# Patient Record
Sex: FEMALE | Race: WHITE | NOT HISPANIC OR LATINO | Employment: OTHER | URBAN - METROPOLITAN AREA
[De-identification: names, ages, dates, MRNs, and addresses within clinical notes are randomized per-mention and may not be internally consistent; named-entity substitution may affect disease eponyms.]

---

## 2017-01-16 ENCOUNTER — APPOINTMENT (OUTPATIENT)
Dept: CT IMAGING | Facility: HOSPITAL | Age: 67
End: 2017-01-16

## 2017-01-16 ENCOUNTER — HOSPITAL ENCOUNTER (EMERGENCY)
Facility: HOSPITAL | Age: 67
Discharge: HOME | End: 2017-01-16
Attending: EMERGENCY MEDICINE | Admitting: EMERGENCY MEDICINE

## 2017-01-16 VITALS
WEIGHT: 128 LBS | BODY MASS INDEX: 22.68 KG/M2 | HEART RATE: 71 BPM | TEMPERATURE: 99.5 F | RESPIRATION RATE: 16 BRPM | SYSTOLIC BLOOD PRESSURE: 127 MMHG | HEIGHT: 63 IN | OXYGEN SATURATION: 97 % | DIASTOLIC BLOOD PRESSURE: 79 MMHG

## 2017-01-16 DIAGNOSIS — J10.1 INFLUENZA A: Primary | ICD-10-CM

## 2017-01-16 DIAGNOSIS — R55 VASOVAGAL SYNCOPE: ICD-10-CM

## 2017-01-16 LAB
ALBUMIN SERPL-MCNC: 4.2 G/DL (ref 3.5–5.2)
ALBUMIN/GLOB SERPL: 1.4 G/DL
ALP SERPL-CCNC: 59 U/L (ref 39–117)
ALT SERPL W P-5'-P-CCNC: 22 U/L (ref 1–33)
ANION GAP SERPL CALCULATED.3IONS-SCNC: 16 MMOL/L
AST SERPL-CCNC: 25 U/L (ref 1–32)
BASOPHILS # BLD AUTO: 0.03 10*3/MM3 (ref 0–0.2)
BASOPHILS NFR BLD AUTO: 0.6 % (ref 0–1.5)
BILIRUB SERPL-MCNC: 0.3 MG/DL (ref 0.1–1.2)
BUN BLD-MCNC: 7 MG/DL (ref 8–23)
BUN/CREAT SERPL: 6.9 (ref 7–25)
CALCIUM SPEC-SCNC: 9.3 MG/DL (ref 8.6–10.5)
CHLORIDE SERPL-SCNC: 97 MMOL/L (ref 98–107)
CO2 SERPL-SCNC: 25 MMOL/L (ref 22–29)
CREAT BLD-MCNC: 1.02 MG/DL (ref 0.57–1)
DEPRECATED RDW RBC AUTO: 45.2 FL (ref 37–54)
EOSINOPHIL # BLD AUTO: 0.08 10*3/MM3 (ref 0–0.7)
EOSINOPHIL NFR BLD AUTO: 1.6 % (ref 0.3–6.2)
ERYTHROCYTE [DISTWIDTH] IN BLOOD BY AUTOMATED COUNT: 12.4 % (ref 11.7–13)
FLUAV AG NPH QL: POSITIVE
FLUBV AG NPH QL IA: NEGATIVE
GFR SERPL CREATININE-BSD FRML MDRD: 54 ML/MIN/1.73
GLOBULIN UR ELPH-MCNC: 2.9 GM/DL
GLUCOSE BLD-MCNC: 140 MG/DL (ref 65–99)
HCT VFR BLD AUTO: 40.6 % (ref 35.6–45.5)
HGB BLD-MCNC: 13.5 G/DL (ref 11.9–15.5)
IMM GRANULOCYTES # BLD: 0 10*3/MM3 (ref 0–0.03)
IMM GRANULOCYTES NFR BLD: 0 % (ref 0–0.5)
LYMPHOCYTES # BLD AUTO: 1.38 10*3/MM3 (ref 0.9–4.8)
LYMPHOCYTES NFR BLD AUTO: 27.3 % (ref 19.6–45.3)
MCH RBC QN AUTO: 32.7 PG (ref 26.9–32)
MCHC RBC AUTO-ENTMCNC: 33.3 G/DL (ref 32.4–36.3)
MCV RBC AUTO: 98.3 FL (ref 80.5–98.2)
MONOCYTES # BLD AUTO: 0.58 10*3/MM3 (ref 0.2–1.2)
MONOCYTES NFR BLD AUTO: 11.5 % (ref 5–12)
NEUTROPHILS # BLD AUTO: 2.98 10*3/MM3 (ref 1.9–8.1)
NEUTROPHILS NFR BLD AUTO: 59 % (ref 42.7–76)
PLATELET # BLD AUTO: 275 10*3/MM3 (ref 140–500)
PMV BLD AUTO: 10.1 FL (ref 6–12)
POTASSIUM BLD-SCNC: 4.1 MMOL/L (ref 3.5–5.2)
PROT SERPL-MCNC: 7.1 G/DL (ref 6–8.5)
RBC # BLD AUTO: 4.13 10*6/MM3 (ref 3.9–5.2)
SODIUM BLD-SCNC: 138 MMOL/L (ref 136–145)
TROPONIN T SERPL-MCNC: <0.01 NG/ML (ref 0–0.03)
WBC NRBC COR # BLD: 5.05 10*3/MM3 (ref 4.5–10.7)

## 2017-01-16 PROCEDURE — 25010000002 ONDANSETRON PER 1 MG: Performed by: EMERGENCY MEDICINE

## 2017-01-16 PROCEDURE — 80053 COMPREHEN METABOLIC PANEL: CPT | Performed by: EMERGENCY MEDICINE

## 2017-01-16 PROCEDURE — 87804 INFLUENZA ASSAY W/OPTIC: CPT | Performed by: EMERGENCY MEDICINE

## 2017-01-16 PROCEDURE — 99284 EMERGENCY DEPT VISIT MOD MDM: CPT

## 2017-01-16 PROCEDURE — 70450 CT HEAD/BRAIN W/O DYE: CPT

## 2017-01-16 PROCEDURE — 93010 ELECTROCARDIOGRAM REPORT: CPT | Performed by: INTERNAL MEDICINE

## 2017-01-16 PROCEDURE — 96361 HYDRATE IV INFUSION ADD-ON: CPT

## 2017-01-16 PROCEDURE — 85025 COMPLETE CBC W/AUTO DIFF WBC: CPT | Performed by: EMERGENCY MEDICINE

## 2017-01-16 PROCEDURE — 96374 THER/PROPH/DIAG INJ IV PUSH: CPT

## 2017-01-16 PROCEDURE — 93005 ELECTROCARDIOGRAM TRACING: CPT | Performed by: EMERGENCY MEDICINE

## 2017-01-16 PROCEDURE — 84484 ASSAY OF TROPONIN QUANT: CPT | Performed by: EMERGENCY MEDICINE

## 2017-01-16 RX ORDER — ONDANSETRON 2 MG/ML
4 INJECTION INTRAMUSCULAR; INTRAVENOUS ONCE
Status: COMPLETED | OUTPATIENT
Start: 2017-01-16 | End: 2017-01-16

## 2017-01-16 RX ORDER — OSELTAMIVIR PHOSPHATE 75 MG/1
75 CAPSULE ORAL 2 TIMES DAILY
Qty: 10 CAPSULE | Refills: 0 | Status: SHIPPED | OUTPATIENT
Start: 2017-01-16 | End: 2018-11-26

## 2017-01-16 RX ORDER — SODIUM CHLORIDE 0.9 % (FLUSH) 0.9 %
10 SYRINGE (ML) INJECTION AS NEEDED
Status: DISCONTINUED | OUTPATIENT
Start: 2017-01-16 | End: 2017-01-16 | Stop reason: HOSPADM

## 2017-01-16 RX ADMIN — ONDANSETRON 4 MG: 2 INJECTION INTRAMUSCULAR; INTRAVENOUS at 09:00

## 2017-01-16 RX ADMIN — SODIUM CHLORIDE 1000 ML: 9 INJECTION, SOLUTION INTRAVENOUS at 09:00

## 2017-01-16 NOTE — ED NOTES
Pt c/o flu like symptoms. This morning, she went to the bathroom. Pt then had a syncopal episode while sitting on toilet. Pt was diaphoretic, nauseated, and soa. Pt denies pain.     Zenobia Hernández RN  01/16/17 0821

## 2017-01-16 NOTE — ED PROVIDER NOTES
EMERGENCY DEPARTMENT ENCOUNTER    CHIEF COMPLAINT  Chief Complaint: Syncope  History given by: Patient  History limited by: Nothing  Room Number: 17/17  PMD: Juana Foley MD      HPI:  Pt is a 66 y.o. female who presents after a syncopal episode that occurred while she was sitting on the toilet finishing getting dressed. She states that she had just  showered and became very diaphoretic and dizzy right before the episode occurred. She remembers waking up on the bathroom floor with moderate nausea and lightheadedness. The patient denies any chest pain, SOA or abdominal pain prior to the syncopal episode. Pt has also experienced productive cough, body aches, chills, diarrhea for the past few days and has been taking Theraflu since onset. No other complaints at this time.      Duration:  Unknown  Onset: Sudden  Timing: Episodic  Location: Generalized  Radiation: None  Quality: Weakness  Intensity/Severity: Moderate  Progression: Resolving  Associated Symptoms: Syncope, weakness, diaphoresis, nausea, lightheadedness, cough, body aches, chills, diarrhea  Aggravating Factors: Unknown  Alleviating Factors: Rest  Previous Episodes: None  Treatment before arrival: Sera-Flu for flu like symptoms    PAST MEDICAL HISTORY  Active Ambulatory Problems     Diagnosis Date Noted   • No Active Ambulatory Problems     Resolved Ambulatory Problems     Diagnosis Date Noted   • No Resolved Ambulatory Problems     Past Medical History   Diagnosis Date   • Cancer    • History of colon polyps    • History of skin cancer    • [place] (hard of hearing)    • Hypercholesteremia        PAST SURGICAL HISTORY  Past Surgical History   Procedure Laterality Date   • Colonoscopy       2013   • Rotator cuff repair     • Mohs surgery     • Blanchard tooth extraction         FAMILY HISTORY  History reviewed. No pertinent family history.    SOCIAL HISTORY  Social History     Social History   • Marital status: Unknown     Spouse name: N/A   • Number of  children: N/A   • Years of education: N/A     Occupational History   • Not on file.     Social History Main Topics   • Smoking status: Never Smoker   • Smokeless tobacco: Not on file   • Alcohol use No   • Drug use: No   • Sexual activity: Defer     Other Topics Concern   • Not on file     Social History Narrative   • No narrative on file       ALLERGIES  Review of patient's allergies indicates no known allergies.    REVIEW OF SYSTEMS  Review of Systems   Constitutional: Positive for chills and diaphoresis. Negative for fatigue.        Body aches   HENT: Negative for congestion, rhinorrhea and sore throat.    Eyes: Negative for pain.   Respiratory: Positive for cough. Negative for shortness of breath and wheezing.    Cardiovascular: Negative for chest pain, palpitations and leg swelling.   Gastrointestinal: Positive for diarrhea and nausea. Negative for abdominal pain and vomiting.   Genitourinary: Negative for difficulty urinating, dysuria, flank pain and frequency.   Musculoskeletal: Negative for arthralgias, myalgias, neck pain and neck stiffness.   Skin: Negative for rash.   Neurological: Positive for syncope, weakness and light-headedness. Negative for dizziness, speech difficulty, numbness and headaches.   Psychiatric/Behavioral: Negative.    All other systems reviewed and are negative.      PHYSICAL EXAM  ED Triage Vitals   Temp Heart Rate Resp BP SpO2   01/16/17 0828 01/16/17 0828 01/16/17 0828 01/16/17 0828 01/16/17 0828   97.9 °F (36.6 °C) 76 20 104/54 100 %      Temp src Heart Rate Source Patient Position BP Location FiO2 (%)   01/16/17 0828 01/16/17 0828 01/16/17 0828 01/16/17 0828 --   Tympanic Monitor Lying Left arm        Physical Exam   Constitutional: She is oriented to person, place, and time and well-developed, well-nourished, and in no distress. No distress.   HENT:   Head: Normocephalic.   Mouth/Throat: Mucous membranes are dry.   Eyes: EOM are normal. Pupils are equal, round, and reactive to  light.   Neck: Normal range of motion.   Cardiovascular: Normal rate and regular rhythm.    No murmur heard.  Pulmonary/Chest: Effort normal and breath sounds normal. No respiratory distress.   Abdominal: Soft. There is no tenderness. There is no rebound and no guarding.   Musculoskeletal: Normal range of motion. She exhibits no edema.   Neurological: She is alert and oriented to person, place, and time.   Normal neuro.    Skin: Skin is warm and dry. No rash noted.   Psychiatric: Mood and affect normal.   Nursing note and vitals reviewed.      LAB RESULTS  Lab Results (last 24 hours)     Procedure Component Value Units Date/Time    CBC & Differential [[id number]] Collected:  01/16/17 0854    Specimen:  Blood Updated:  01/16/17 0916    Narrative:       The following orders were created for panel order CBC & Differential.  Procedure                               Abnormality         Status                     ---------                               -----------         ------                     CBC Auto Differential[[id number]]         Abnormal            Final result                 Please view results for these tests on the individual orders.    Comprehensive Metabolic Panel [[id number]]  (Abnormal) Collected:  01/16/17 0854    Specimen:  Blood Updated:  01/16/17 0936     Glucose 140 (H) mg/dL      BUN 7 (L) mg/dL      Creatinine 1.02 (H) mg/dL      Sodium 138 mmol/L      Potassium 4.1 mmol/L      Chloride 97 (L) mmol/L      CO2 25.0 mmol/L      Calcium 9.3 mg/dL      Total Protein 7.1 g/dL      Albumin 4.20 g/dL      ALT (SGPT) 22 U/L      AST (SGOT) 25 U/L      Alkaline Phosphatase 59 U/L      Total Bilirubin 0.3 mg/dL      eGFR Non African Amer 54 (L) mL/min/1.73      Globulin 2.9 gm/dL      A/G Ratio 1.4 g/dL      BUN/Creatinine Ratio 6.9 (L)      Anion Gap 16.0 mmol/L     Troponin [[id number]]  (Normal) Collected:  01/16/17 0854    Specimen:  Blood Updated:  01/16/17 0936     Troponin T <0.010 ng/mL     Narrative:        Troponin T Reference Ranges:  Less than 0.03 ng/mL:    Negative for AMI  0.03 to 0.09 ng/mL:      Indeterminant for AMI  Greater than 0.09 ng/mL: Positive for AMI    CBC Auto Differential [[id number]]  (Abnormal) Collected:  01/16/17 0854    Specimen:  Blood Updated:  01/16/17 0916     WBC 5.05 10*3/mm3      RBC 4.13 10*6/mm3      Hemoglobin 13.5 g/dL      Hematocrit 40.6 %      MCV 98.3 (H) fL      MCH 32.7 (H) pg      MCHC 33.3 g/dL      RDW 12.4 %      RDW-SD 45.2 fl      MPV 10.1 fL      Platelets 275 10*3/mm3      Neutrophil % 59.0 %      Lymphocyte % 27.3 %      Monocyte % 11.5 %      Eosinophil % 1.6 %      Basophil % 0.6 %      Immature Grans % 0.0 %      Neutrophils, Absolute 2.98 10*3/mm3      Lymphocytes, Absolute 1.38 10*3/mm3      Monocytes, Absolute 0.58 10*3/mm3      Eosinophils, Absolute 0.08 10*3/mm3      Basophils, Absolute 0.03 10*3/mm3      Immature Grans, Absolute 0.00 10*3/mm3     Influenza Antigen [[id number]]  (Abnormal) Collected:  01/16/17 0856    Specimen:  Swab from Nasopharynx Updated:  01/16/17 0926     Influenza A Ag, EIA Positive (A)      Influenza B Ag, EIA Negative           I ordered the above labs and reviewed the results    RADIOLOGY  CT Head Without Contrast    (Results Pending)      0920  Reviewed CT Head - Nothing acute. Small vessel disease. Independently viewed by me. Interpreted by radiologist. Discussed with .    I ordered the above noted radiological studies. Interpreted by radiologist. Discussed with radiologist (). Reviewed by me in PACS.       PROCEDURES  Procedures    EKG           EKG time: 0854  Rhythm/Rate: SB 59  P waves and CT: Normal  QRS, axis: Normal   ST and T waves: Normal     Interpreted Contemporaneously by me, independently viewed  Unchanged compared to prior 9/3/15      PROGRESS AND CONSULTS  ED Course     0838  Ordered Labs, EKG and CT Head for further evaluation. Also ordered Zofran for nausea and IVF for hydration.     0929  Patient  ambulated to the bathroom unassisted.     1041  Rechecked patient and they are resting comfortably. Patient had tingling in fingers earlier but this has resolved. She had normal sensation and pulses upon re-exam. Discussed the patient's pertinent labs and imaging results, including positive influenza A but her EKG and CXR were normal. Discussed plan to discharge the patient home and recommended follow up with PCP if her symptoms persist. Patient agrees with the plan and all questions were addressed.     Latest vital signs   BP- 129/82 HR- 65 Temp- 97.9 °F (36.6 °C) (Tympanic) O2 sat- 98%      MEDICAL DECISION MAKING  Results were reviewed/discussed with the patient and they were also made aware of online access. Pt also made aware that some labs, such as cultures, will not be resulted during ER visit and follow up with PMD is necessary.     MDM  Number of Diagnoses or Management Options  Influenza A:   Vasovagal syncope:   Diagnosis management comments: Patient states she has had a cough, body aches, and chills for the past 2-1/2-3 days.  This morning she had a syncopal episode while sitting on the toilet getting dressed.  She states she felt very sweaty and dizzy just prior to passing out.  She denies any injury.  Patient's EKG was normal and unchanged.  Head CT was negative.  Patient tested positive for influenza type A. the remainder of her labs were unremarkable.  She was given IV fluids and IV Zofran.  Her symptoms are consistent with vasovagal syncope.  Patient be discharged with prescription for Tamiflu.       Amount and/or Complexity of Data Reviewed  Clinical lab tests: ordered and reviewed  Tests in the radiology section of CPT®: ordered and reviewed  Tests in the medicine section of CPT®: ordered and reviewed  Discussion of test results with the performing providers: yes    Patient Progress  Patient progress: stable         DIAGNOSIS  Final diagnoses:   Influenza A   Vasovagal syncope        DISPOSITION  DISCHARGE    Patient discharged in stable condition.    Reviewed implications of results, diagnosis, meds, responsibility to follow up, warning signs and symptoms of possible worsening, potential complications and reasons to return to ER, including any further syncopal episodes.    Patient/Family voiced understanding of above instructions.    Discussed plan for discharge, as there is no emergent indication for admission.  Pt/family is agreeable and understands need for follow up and repeat testing.  Pt is aware that discharge does not mean that nothing is wrong but it indicates no emergency is present that requires admission and they must continue care with follow-up as given below or physician of their choice.     FOLLOW-UP  Juana Foley MD  [address] [address]  [phone]    In 4 days           Medication List      New Prescriptions          oseltamivir 75 MG capsule   Commonly known as:  TAMIFLU   Take 1 capsule by mouth 2 (Two) Times a Day.         Latest Documented Vital Signs:  As of 11:06 AM  BP- 129/82 HR- 65 Temp- 97.9 °F (36.6 °C) (Tympanic) O2 sat- 98%    --  Documentation assistance provided by kymberly Romero for .  Information recorded by the kymberly was done at my direction and has been verified and validated by me.         Walt Romero  01/16/17 1138       Dajuan Lam MD  01/16/17 5942

## 2017-01-16 NOTE — ED NOTES
Pt has been taking theraflu for 3 days. Pt states she had some relief with meds.     Zenobia Hernández RN  01/16/17 3068

## 2017-01-16 NOTE — ED NOTES
Pt c/o left finger tip numbness and tingling. MD notified.     Zenobia Hernández RN  01/16/17 0993

## 2017-01-16 NOTE — DISCHARGE INSTRUCTIONS
Take medication as prescribed.  Drink plenty of fluids.  Follow-up with your doctor later this week if symptoms have not resolved.  Return to emergency department for chest pain, dizziness, recurrent fainting, shortness of breath, or other concern.

## 2017-03-01 ENCOUNTER — APPOINTMENT (OUTPATIENT)
Dept: WOMENS IMAGING | Facility: HOSPITAL | Age: 67
End: 2017-03-01

## 2017-03-01 PROCEDURE — G0279 TOMOSYNTHESIS, MAMMO: HCPCS | Performed by: RADIOLOGY

## 2017-03-01 PROCEDURE — G0206 DX MAMMO INCL CAD UNI: HCPCS | Performed by: RADIOLOGY

## 2017-03-01 PROCEDURE — 76641 ULTRASOUND BREAST COMPLETE: CPT | Performed by: RADIOLOGY

## 2017-07-17 ENCOUNTER — APPOINTMENT (OUTPATIENT)
Dept: WOMENS IMAGING | Facility: HOSPITAL | Age: 67
End: 2017-07-17

## 2017-07-17 PROCEDURE — G0202 SCR MAMMO BI INCL CAD: HCPCS | Performed by: RADIOLOGY

## 2017-07-17 PROCEDURE — 77063 BREAST TOMOSYNTHESIS BI: CPT | Performed by: RADIOLOGY

## 2017-07-28 ENCOUNTER — APPOINTMENT (OUTPATIENT)
Dept: WOMENS IMAGING | Facility: HOSPITAL | Age: 67
End: 2017-07-28

## 2017-07-28 PROCEDURE — 77080 DXA BONE DENSITY AXIAL: CPT | Performed by: RADIOLOGY

## 2018-06-19 ENCOUNTER — TRANSCRIBE ORDERS (OUTPATIENT)
Dept: ADMINISTRATIVE | Facility: HOSPITAL | Age: 68
End: 2018-06-19

## 2018-06-19 DIAGNOSIS — R63.4 LOSS OF WEIGHT: ICD-10-CM

## 2018-06-19 DIAGNOSIS — R10.9 ABDOMINAL PAIN, UNSPECIFIED ABDOMINAL LOCATION: ICD-10-CM

## 2018-06-19 DIAGNOSIS — R11.0 NAUSEA: Primary | ICD-10-CM

## 2018-06-21 ENCOUNTER — HOSPITAL ENCOUNTER (OUTPATIENT)
Dept: ULTRASOUND IMAGING | Facility: HOSPITAL | Age: 68
Discharge: HOME | End: 2018-06-21
Admitting: INTERNAL MEDICINE

## 2018-06-21 DIAGNOSIS — R63.4 LOSS OF WEIGHT: ICD-10-CM

## 2018-06-21 DIAGNOSIS — R10.9 ABDOMINAL PAIN, UNSPECIFIED ABDOMINAL LOCATION: ICD-10-CM

## 2018-06-21 DIAGNOSIS — R11.0 NAUSEA: ICD-10-CM

## 2018-06-21 PROCEDURE — 76705 ECHO EXAM OF ABDOMEN: CPT

## 2018-07-18 ENCOUNTER — APPOINTMENT (OUTPATIENT)
Dept: WOMENS IMAGING | Facility: HOSPITAL | Age: 68
End: 2018-07-18

## 2018-07-18 PROCEDURE — 77063 BREAST TOMOSYNTHESIS BI: CPT | Performed by: RADIOLOGY

## 2018-07-18 PROCEDURE — 77067 SCR MAMMO BI INCL CAD: CPT | Performed by: RADIOLOGY

## 2018-08-15 ENCOUNTER — PREP FOR SURGERY (OUTPATIENT)
Dept: OTHER | Facility: HOSPITAL | Age: 68
End: 2018-08-15

## 2018-08-15 DIAGNOSIS — R11.0 NAUSEA: Primary | ICD-10-CM

## 2018-08-15 DIAGNOSIS — R63.4 WEIGHT LOSS: ICD-10-CM

## 2018-08-20 PROBLEM — R11.0 NAUSEA: Status: ACTIVE | Noted: 2018-08-20

## 2018-08-20 PROBLEM — R63.4 WEIGHT LOSS: Status: ACTIVE | Noted: 2018-08-20

## 2018-08-28 ENCOUNTER — ANESTHESIA (OUTPATIENT)
Dept: GASTROENTEROLOGY | Facility: HOSPITAL | Age: 68
End: 2018-08-28

## 2018-08-28 ENCOUNTER — HOSPITAL ENCOUNTER (OUTPATIENT)
Facility: HOSPITAL | Age: 68
Setting detail: HOSPITAL OUTPATIENT SURGERY
Discharge: HOME | End: 2018-08-28
Attending: INTERNAL MEDICINE | Admitting: INTERNAL MEDICINE

## 2018-08-28 ENCOUNTER — ANESTHESIA EVENT (OUTPATIENT)
Dept: GASTROENTEROLOGY | Facility: HOSPITAL | Age: 68
End: 2018-08-28

## 2018-08-28 VITALS
TEMPERATURE: 98 F | SYSTOLIC BLOOD PRESSURE: 135 MMHG | BODY MASS INDEX: 21.35 KG/M2 | DIASTOLIC BLOOD PRESSURE: 72 MMHG | RESPIRATION RATE: 16 BRPM | OXYGEN SATURATION: 98 % | WEIGHT: 120.5 LBS | HEART RATE: 60 BPM

## 2018-08-28 DIAGNOSIS — R63.4 WEIGHT LOSS: ICD-10-CM

## 2018-08-28 DIAGNOSIS — R11.0 NAUSEA: ICD-10-CM

## 2018-08-28 PROCEDURE — 43239 EGD BIOPSY SINGLE/MULTIPLE: CPT | Performed by: INTERNAL MEDICINE

## 2018-08-28 PROCEDURE — 87081 CULTURE SCREEN ONLY: CPT | Performed by: INTERNAL MEDICINE

## 2018-08-28 PROCEDURE — 25010000002 PROPOFOL 10 MG/ML EMULSION: Performed by: ANESTHESIOLOGY

## 2018-08-28 PROCEDURE — S0260 H&P FOR SURGERY: HCPCS | Performed by: INTERNAL MEDICINE

## 2018-08-28 PROCEDURE — 88312 SPECIAL STAINS GROUP 1: CPT | Performed by: INTERNAL MEDICINE

## 2018-08-28 PROCEDURE — 88305 TISSUE EXAM BY PATHOLOGIST: CPT | Performed by: INTERNAL MEDICINE

## 2018-08-28 RX ORDER — SODIUM CHLORIDE 0.9 % (FLUSH) 0.9 %
3 SYRINGE (ML) INJECTION AS NEEDED
Status: DISCONTINUED | OUTPATIENT
Start: 2018-08-28 | End: 2018-08-28 | Stop reason: HOSPADM

## 2018-08-28 RX ORDER — LIDOCAINE HYDROCHLORIDE 20 MG/ML
INJECTION, SOLUTION INFILTRATION; PERINEURAL AS NEEDED
Status: DISCONTINUED | OUTPATIENT
Start: 2018-08-28 | End: 2018-08-28 | Stop reason: SURG

## 2018-08-28 RX ORDER — SODIUM CHLORIDE, SODIUM LACTATE, POTASSIUM CHLORIDE, CALCIUM CHLORIDE 600; 310; 30; 20 MG/100ML; MG/100ML; MG/100ML; MG/100ML
1000 INJECTION, SOLUTION INTRAVENOUS CONTINUOUS
Status: DISCONTINUED | OUTPATIENT
Start: 2018-08-28 | End: 2018-08-28 | Stop reason: HOSPADM

## 2018-08-28 RX ORDER — ASPIRIN 81 MG/1
81 TABLET ORAL DAILY
COMMUNITY
End: 2019-08-22

## 2018-08-28 RX ORDER — PROPOFOL 10 MG/ML
VIAL (ML) INTRAVENOUS AS NEEDED
Status: DISCONTINUED | OUTPATIENT
Start: 2018-08-28 | End: 2018-08-28 | Stop reason: SURG

## 2018-08-28 RX ADMIN — LIDOCAINE HYDROCHLORIDE 50 MG: 20 INJECTION, SOLUTION INFILTRATION; PERINEURAL at 14:36

## 2018-08-28 RX ADMIN — PROPOFOL 100 MG: 10 INJECTION, EMULSION INTRAVENOUS at 14:35

## 2018-08-28 RX ADMIN — SODIUM CHLORIDE, POTASSIUM CHLORIDE, SODIUM LACTATE AND CALCIUM CHLORIDE 1000 ML: 600; 310; 30; 20 INJECTION, SOLUTION INTRAVENOUS at 13:36

## 2018-08-28 NOTE — ANESTHESIA POSTPROCEDURE EVALUATION
Patient: Tia Mcnulty    Procedure Summary     Date:  08/28/18 Room / Location:  [facility] ENDOSCOPY 1 /  [facility]    Anesthesia Start:  1432 Anesthesia Stop:  1445    Procedure:  ESOPHAGOGASTRODUODENOSCOPY with biopsies (N/A Esophagus) Diagnosis:       Nausea      Weight loss      (Nausea [R11.0])      (Weight loss [R63.4])    Surgeon:  Mauricio Perez MD Provider:  Jaxon Saldivar MD    Anesthesia Type:  MAC ASA Status:  3          Anesthesia Type: MAC  Last vitals  BP   128/91 (08/28/18 1325)   Temp   36.7 °C (98 °F) (08/28/18 1325)   Pulse   70 (08/28/18 1325)   Resp   16 (08/28/18 1325)     SpO2   96 % (08/28/18 1325)     Post Anesthesia Care and Evaluation    Patient location during evaluation: bedside  Patient participation: complete - patient participated  Level of consciousness: awake  Pain score: 1  Pain management: adequate  Airway patency: patent  Anesthetic complications: No anesthetic complications    Cardiovascular status: acceptable  Respiratory status: acceptable  Hydration status: acceptable    Comments: /91 (BP Location: Left arm, Patient Position: Lying)   Pulse 70   Temp 36.7 °C (98 °F) (Oral)   Resp 16   Wt 54.7 kg (120 lb 8 oz)   SpO2 96%   BMI 21.35 kg/m²

## 2018-08-28 NOTE — ANESTHESIA PREPROCEDURE EVALUATION
Anesthesia Evaluation     Patient summary reviewed and Nursing notes reviewed   NPO Solid Status: > 8 hours  NPO Liquid Status: > 2 hours           Airway   Mallampati: II  no difficulty expected  Dental - normal exam     Pulmonary     breath sounds clear to auscultation  Cardiovascular     ECG reviewed  Rhythm: regular  Rate: normal    (+) hyperlipidemia,       Neuro/Psych  GI/Hepatic/Renal/Endo      Musculoskeletal     Abdominal    Substance History      OB/GYN          Other      history of cancer                    Anesthesia Plan    ASA 3     MAC   total IV anesthesia  intravenous induction   Anesthetic plan and risks discussed with patient.

## 2018-08-29 LAB — UREASE TISS QL: NEGATIVE

## 2018-08-30 LAB
CYTO UR: NORMAL
LAB AP CASE REPORT: NORMAL
LAB AP CLINICAL INFORMATION: NORMAL
PATH REPORT.FINAL DX SPEC: NORMAL
PATH REPORT.GROSS SPEC: NORMAL

## 2018-08-31 ENCOUNTER — TELEPHONE (OUTPATIENT)
Dept: GASTROENTEROLOGY | Facility: CLINIC | Age: 68
End: 2018-08-31

## 2018-08-31 RX ORDER — PANTOPRAZOLE SODIUM 40 MG/1
40 TABLET, DELAYED RELEASE ORAL DAILY
Qty: 30 TABLET | Refills: 5 | Status: SHIPPED | OUTPATIENT
Start: 2018-08-31 | End: 2019-08-22

## 2018-11-26 ENCOUNTER — TELEPHONE (OUTPATIENT)
Dept: GASTROENTEROLOGY | Facility: CLINIC | Age: 68
End: 2018-11-26

## 2018-11-26 ENCOUNTER — OFFICE VISIT (OUTPATIENT)
Dept: GASTROENTEROLOGY | Facility: CLINIC | Age: 68
End: 2018-11-26

## 2018-11-26 VITALS
SYSTOLIC BLOOD PRESSURE: 118 MMHG | BODY MASS INDEX: 21.55 KG/M2 | DIASTOLIC BLOOD PRESSURE: 72 MMHG | WEIGHT: 121.6 LBS | TEMPERATURE: 98.3 F | HEIGHT: 63 IN

## 2018-11-26 DIAGNOSIS — K21.9 GASTROESOPHAGEAL REFLUX DISEASE, ESOPHAGITIS PRESENCE NOT SPECIFIED: ICD-10-CM

## 2018-11-26 DIAGNOSIS — R11.2 NAUSEA AND VOMITING, INTRACTABILITY OF VOMITING NOT SPECIFIED, UNSPECIFIED VOMITING TYPE: Primary | ICD-10-CM

## 2018-11-26 PROCEDURE — 99214 OFFICE O/P EST MOD 30 MIN: CPT | Performed by: INTERNAL MEDICINE

## 2018-11-26 NOTE — TELEPHONE ENCOUNTER
Pt in office.  Concerned because has tried pantoprazole 40 mg daily x 1mo as instructed.  Tried tapering off with resumption of symptoms.  Concerned re: ongoing symptoms, and concerned to be on long term med.      Requesting appt - scheduled with Dr Perez for today @ 2pm.

## 2018-11-26 NOTE — PROGRESS NOTES
"Chief Complaint   Patient presents with   • Nausea   • Vomiting        Tia Mcnulty is a  68 y.o. female here for a follow up visit for nausea and vomiting, GERD    HPI this 68-year-old white female patient of Dr. Juana Foley returns in follow-up since she underwent upper endoscopic evaluation August 28 of this year.  That study was performed because of nausea and weight loss.  She continues to have intermittent episodes of nausea or a \"unsettled stomach\".  It does not seem to be of a positional nature or specific to dietary intake.  She had taken pantoprazole for 2 months after her endoscopy and stop this medication.  She believes her symptoms did improve and then the recur upon stopping the medication.  I've advised her to resume its use for a total of 3 months and then to call with a progress report.  Previous ultrasound was obtained of the gallbladder which was normal.  Further evaluation might include a HIDA scan as well as a gastric emptying study.  She has a history of polyps and her last colonoscopy was performed in 2016.  She would be due for follow-up colonoscopy in 2021.    Past Medical History:   Diagnosis Date   • Cancer (CMS/HCC)    • GERD (gastroesophageal reflux disease)    • History of colon polyps    • History of skin cancer    • [place] (hard of hearing)     HEARING AID EACH EAR   • Hypercholesteremia        Current Outpatient Medications   Medication Sig Dispense Refill   • aspirin 81 MG EC tablet Take 81 mg by mouth Daily.     • B Complex Vitamins (VITAMIN B COMPLEX PO) Take 1 tablet by mouth Daily.     • CALCIUM PO Take 1 tablet by mouth Daily.     • Cholecalciferol (VITAMIN D-3 PO) Take 1 tablet by mouth Daily.     • ezetimibe (ZETIA) 10 MG tablet Take 10 mg by mouth daily.     • Multiple Vitamins-Minerals (MULTIVITAMIN ADULT PO) Take 1 tablet by mouth Daily.     • pantoprazole (PROTONIX) 40 MG EC tablet Take 1 tablet by mouth Daily. 30 tablet 5     No current facility-administered " medications for this visit.        PRN Meds:.    No Known Allergies    Social History     Socioeconomic History   • Marital status: Unknown     Spouse name: Not on file   • Number of children: Not on file   • Years of education: Not on file   • Highest education level: Not on file   Social Needs   • Financial resource strain: Not on file   • Food insecurity - worry: Not on file   • Food insecurity - inability: Not on file   • Transportation needs - medical: Not on file   • Transportation needs - non-medical: Not on file   Occupational History   • Not on file   Tobacco Use   • Smoking status: Never Smoker   Substance and Sexual Activity   • Alcohol use: No   • Drug use: No   • Sexual activity: Defer   Other Topics Concern   • Not on file   Social History Narrative   • Not on file       Family History   Problem Relation Age of Onset   • Stomach cancer Paternal Grandmother        Review of Systems   Constitutional: Negative for activity change, appetite change, fatigue and unexpected weight change.   HENT: Negative for congestion, facial swelling, sore throat, trouble swallowing and voice change.    Eyes: Negative for photophobia and visual disturbance.   Respiratory: Negative for cough and choking.    Cardiovascular: Negative for chest pain.   Gastrointestinal: Positive for nausea. Negative for abdominal distention, abdominal pain, anal bleeding, blood in stool, constipation, diarrhea, rectal pain and vomiting.   Endocrine: Negative for polyphagia.   Musculoskeletal: Negative for arthralgias, gait problem and joint swelling.   Skin: Negative for color change, pallor and rash.   Allergic/Immunologic: Negative for food allergies.   Neurological: Negative for speech difficulty and headaches.   Hematological: Does not bruise/bleed easily.   Psychiatric/Behavioral: Negative for agitation, confusion and sleep disturbance.       Vitals:    11/26/18 1352   BP: 118/72   Temp: 98.3 °F (36.8 °C)       Physical Exam    Constitutional: She is oriented to person, place, and time. She appears well-developed and well-nourished.   HENT:   Head: Normocephalic.   Mouth/Throat: Oropharynx is clear and moist.   Eyes: Conjunctivae and EOM are normal.   Neck: Normal range of motion.   Cardiovascular: Normal rate and regular rhythm.   Pulmonary/Chest: Breath sounds normal.   Abdominal: Soft. Bowel sounds are normal.   Musculoskeletal: Normal range of motion.   Neurological: She is alert and oriented to person, place, and time.   Skin: Skin is warm and dry.   Psychiatric: She has a normal mood and affect. Her behavior is normal.       ASSESSMENT   #1 persistent nausea  #2 GERD  #3 history of polyps      PLAN  Complete a 3 month course of pantoprazole  Call with progress report after stopping the medication for 2-4 weeks  Consider gastric emptying study if symptoms persist      ICD-10-CM ICD-9-CM   1. Nausea and vomiting, intractability of vomiting not specified, unspecified vomiting type R11.2 787.01   2. Gastroesophageal reflux disease, esophagitis presence not specified K21.9 530.81

## 2019-01-18 ENCOUNTER — TRANSCRIBE ORDERS (OUTPATIENT)
Dept: ADMINISTRATIVE | Facility: HOSPITAL | Age: 69
End: 2019-01-18

## 2019-01-18 DIAGNOSIS — H90.5 SENSORINEURAL HEARING LOSS (SNHL), UNSPECIFIED LATERALITY: Primary | ICD-10-CM

## 2019-01-24 ENCOUNTER — APPOINTMENT (OUTPATIENT)
Dept: MRI IMAGING | Facility: HOSPITAL | Age: 69
End: 2019-01-24
Attending: OTOLARYNGOLOGY

## 2019-01-24 ENCOUNTER — HOSPITAL ENCOUNTER (OUTPATIENT)
Dept: MRI IMAGING | Facility: HOSPITAL | Age: 69
Discharge: HOME | End: 2019-01-24
Attending: OTOLARYNGOLOGY | Admitting: OTOLARYNGOLOGY

## 2019-01-24 DIAGNOSIS — H90.5 SENSORINEURAL HEARING LOSS (SNHL), UNSPECIFIED LATERALITY: ICD-10-CM

## 2019-01-24 LAB — CREAT BLDA-MCNC: 1 MG/DL (ref 0.6–1.3)

## 2019-01-24 PROCEDURE — 82565 ASSAY OF CREATININE: CPT

## 2019-01-24 PROCEDURE — A9577 INJ MULTIHANCE: HCPCS | Performed by: OTOLARYNGOLOGY

## 2019-01-24 PROCEDURE — 70553 MRI BRAIN STEM W/O & W/DYE: CPT

## 2019-01-24 PROCEDURE — 0 GADOBENATE DIMEGLUMINE 529 MG/ML SOLUTION: Performed by: OTOLARYNGOLOGY

## 2019-01-24 RX ADMIN — GADOBENATE DIMEGLUMINE 10 ML: 529 INJECTION, SOLUTION INTRAVENOUS at 17:35

## 2019-02-01 NOTE — PROGRESS NOTES
Subjective   Patient ID: Tia Mcnulty is a 68 y.o. female is being seen for consultation today at the request of Jerel Madrigal MD for bilateral hearing loss R>L. She has some mild headaches.    History of Present Illness     This patient has been having increasing difficulty with her hearing.  These problems originally began some years ago but have gotten worse just recently.  As a result of this she was seen by her ENT doctor who ordered an MRI.  She has some headaches and a little dizziness occasionally but no other associated symptoms.  Her hearing loss has gotten worse without any particular cause.  Nothing specific makes the hearing better or worse.    The following portions of the patient's history were reviewed and updated as appropriate: allergies, current medications, past family history, past medical history, past social history, past surgical history and problem list.    Review of Systems   HENT: Positive for hearing loss ( R>L).    Eyes: Negative for visual disturbance.   Respiratory: Negative for chest tightness and shortness of breath.    Cardiovascular: Negative for chest pain.   Neurological: Positive for headaches. Negative for dizziness and facial asymmetry.   All other systems reviewed and are negative.      Objective   Physical Exam   Constitutional: She is oriented to person, place, and time. She appears well-developed and well-nourished.   HENT:   Head: Normocephalic and atraumatic.   Eyes: Conjunctivae and EOM are normal. Pupils are equal, round, and reactive to light.   Fundoscopic exam:       The right eye shows no papilledema. The right eye shows venous pulsations.        The left eye shows no papilledema. The left eye shows venous pulsations.   Neck: Carotid bruit is not present.   Neurological: She is oriented to person, place, and time. She has a normal Finger-Nose-Finger Test and a normal Heel to Shin Test. Gait normal.   Reflex Scores:       Tricep reflexes are 2+ on the right  side and 2+ on the left side.       Bicep reflexes are 2+ on the right side and 2+ on the left side.       Brachioradialis reflexes are 2+ on the right side and 2+ on the left side.       Patellar reflexes are 2+ on the right side and 2+ on the left side.       Achilles reflexes are 2+ on the right side and 2+ on the left side.  Psychiatric: Her speech is normal.     Neurologic Exam     Mental Status   Oriented to person, place, and time.   Registration of memory: Good recent and remote memory.   Attention: normal. Concentration: normal.   Speech: speech is normal   Level of consciousness: alert  Knowledge: consistent with education.     Cranial Nerves     CN II   Visual fields full to confrontation.   Visual acuity: normal    CN III, IV, VI   Pupils are equal, round, and reactive to light.  Extraocular motions are normal.     CN V   Facial sensation intact.   Right corneal reflex: normal  Left corneal reflex: normal    CN VII   Facial expression full, symmetric.   Right facial weakness: none  Left facial weakness: none    CN VIII   Hearing: intact    CN IX, X   Palate: symmetric    CN XI   Right sternocleidomastoid strength: normal  Left sternocleidomastoid strength: normal    CN XII   Tongue: not atrophic  Tongue deviation: none    Motor Exam   Muscle bulk: normal  Right arm tone: normal  Left arm tone: normal  Right leg tone: normal  Left leg tone: normal    Strength   Strength 5/5 except as noted.     Sensory Exam   Light touch normal.     Gait, Coordination, and Reflexes     Gait  Gait: normal    Coordination   Finger to nose coordination: normal  Heel to shin coordination: normal    Reflexes   Right brachioradialis: 2+  Left brachioradialis: 2+  Right biceps: 2+  Left biceps: 2+  Right triceps: 2+  Left triceps: 2+  Right patellar: 2+  Left patellar: 2+  Right achilles: 2+  Left achilles: 2+  Right : 2+  Left : 2+      Assessment/Plan   Independent Review of Radiographic Studies:      I reviewed an MRI  of her brain done on January 24.  I also reviewed one done on November 10 of 2011.  The MRI on January 24 shows an area of enhancement in the right parietal region consistent with some vessels invaginating into a cortical sulcus.  There is no evidence of tumor.  I reviewed an MRI of her brain as well as done in 2011 and this does show some enhancement in the same area although it doesn't look exactly the same although this abnormality is subtle enough that is probably cut related.    Medical Decision Making:      I told the patient and her family about the imaging.  I told her that from my point of view this is not a tumor and is almost certainly just some blood vessels in the area.   I believe this to be a complete over read by the radiologist.  Nonetheless because the question has been raised I see no option but to proceed with a follow-up scan in about 6 months.  We'll ask for thin cuts to be done through this area.  She will get back in to see her ENT doctor about the hearing loss which I have no expertise in.    Diagnoses and all orders for this visit:    Brain vascular malformation  -     MRI Brain With & Without Contrast; Future      Return in about 6 months (around 8/5/2019).

## 2019-02-05 ENCOUNTER — OFFICE VISIT (OUTPATIENT)
Dept: NEUROSURGERY | Facility: CLINIC | Age: 69
End: 2019-02-05

## 2019-02-05 VITALS
SYSTOLIC BLOOD PRESSURE: 142 MMHG | HEART RATE: 84 BPM | DIASTOLIC BLOOD PRESSURE: 87 MMHG | WEIGHT: 126.5 LBS | HEIGHT: 63 IN | BODY MASS INDEX: 22.41 KG/M2

## 2019-02-05 DIAGNOSIS — Q28.3 BRAIN VASCULAR MALFORMATION: Primary | ICD-10-CM

## 2019-02-05 PROCEDURE — 99203 OFFICE O/P NEW LOW 30 MIN: CPT | Performed by: NEUROLOGICAL SURGERY

## 2019-02-25 ENCOUNTER — LAB (OUTPATIENT)
Dept: LAB | Facility: HOSPITAL | Age: 69
End: 2019-02-25

## 2019-02-25 ENCOUNTER — TRANSCRIBE ORDERS (OUTPATIENT)
Dept: LAB | Facility: HOSPITAL | Age: 69
End: 2019-02-25

## 2019-02-25 DIAGNOSIS — H91.21 SUDDEN RIGHT HEARING LOSS: Primary | ICD-10-CM

## 2019-02-25 DIAGNOSIS — H91.21 SUDDEN RIGHT HEARING LOSS: ICD-10-CM

## 2019-02-25 PROCEDURE — 83520 IMMUNOASSAY QUANT NOS NONAB: CPT

## 2019-02-25 PROCEDURE — 86256 FLUORESCENT ANTIBODY TITER: CPT

## 2019-02-25 PROCEDURE — 86148 ANTI-PHOSPHOLIPID ANTIBODY: CPT

## 2019-02-25 PROCEDURE — 86332 IMMUNE COMPLEX ASSAY: CPT

## 2019-02-25 PROCEDURE — 84182 PROTEIN WESTERN BLOT TEST: CPT

## 2019-02-25 PROCEDURE — 86038 ANTINUCLEAR ANTIBODIES: CPT

## 2019-02-25 PROCEDURE — 86431 RHEUMATOID FACTOR QUANT: CPT

## 2019-02-25 PROCEDURE — 36415 COLL VENOUS BLD VENIPUNCTURE: CPT

## 2019-02-26 LAB
ANA SER QL IA: POSITIVE
ANA SPECKLED TITR SER: ABNORMAL {TITER}
Lab: ABNORMAL

## 2019-02-27 LAB
C-ANCA TITR SER IF: NORMAL TITER
MYELOPEROXIDASE AB SER-ACNC: <9 U/ML (ref 0–9)
P-ANCA ATYPICAL TITR SER IF: NORMAL TITER
P-ANCA TITR SER IF: NORMAL TITER
PROTEINASE3 AB SER IA-ACNC: <3.5 U/ML (ref 0–3.5)
PS IGA SER-ACNC: 7 APS IGA (ref 0–20)
PS IGG SER-ACNC: 7 GPS IGG (ref 0–11)
PS IGM SER-ACNC: 4 MPS IGM (ref 0–25)

## 2019-03-01 LAB
INNER EAR 68KD AB SERPL QL IB: NEGATIVE
REF LAB TEST RESULTS: NORMAL

## 2019-03-02 LAB
REF LAB TEST RESULTS: NORMAL
REF LAB TEST RESULTS: NORMAL

## 2019-03-07 LAB
REF LAB TEST RESULTS: NORMAL

## 2019-03-12 LAB — REF LAB TEST RESULTS: NORMAL

## 2019-07-16 ENCOUNTER — TELEPHONE (OUTPATIENT)
Dept: GASTROENTEROLOGY | Facility: CLINIC | Age: 69
End: 2019-07-16

## 2019-07-16 ENCOUNTER — TRANSCRIBE ORDERS (OUTPATIENT)
Dept: ADMINISTRATIVE | Facility: HOSPITAL | Age: 69
End: 2019-07-16

## 2019-07-16 DIAGNOSIS — Z78.0 POST-MENOPAUSAL: Primary | ICD-10-CM

## 2019-07-16 NOTE — TELEPHONE ENCOUNTER
Faxed request received from [facility] for pantoprazole 40 mg 1 tab po daily, #30, R5.    See o/v note of 11/26/18 - pt was to complete 3 mo course of above, and call with progress reports after completing.  \    Call to pt.  States took for 3 mo's with success - doing well. Has not requested refill. Denial faxed to [phone] - confirmation received.

## 2019-07-19 ENCOUNTER — APPOINTMENT (OUTPATIENT)
Dept: WOMENS IMAGING | Facility: HOSPITAL | Age: 69
End: 2019-07-19

## 2019-07-19 PROCEDURE — 77067 SCR MAMMO BI INCL CAD: CPT | Performed by: RADIOLOGY

## 2019-07-19 PROCEDURE — 77063 BREAST TOMOSYNTHESIS BI: CPT | Performed by: RADIOLOGY

## 2019-07-19 PROCEDURE — 77080 DXA BONE DENSITY AXIAL: CPT | Performed by: RADIOLOGY

## 2019-07-24 ENCOUNTER — APPOINTMENT (OUTPATIENT)
Dept: MRI IMAGING | Facility: HOSPITAL | Age: 69
End: 2019-07-24

## 2019-08-05 ENCOUNTER — HOSPITAL ENCOUNTER (OUTPATIENT)
Dept: MRI IMAGING | Facility: HOSPITAL | Age: 69
Discharge: HOME | End: 2019-08-05
Admitting: NEUROLOGICAL SURGERY

## 2019-08-05 DIAGNOSIS — Q28.3 BRAIN VASCULAR MALFORMATION: ICD-10-CM

## 2019-08-05 PROCEDURE — A9577 INJ MULTIHANCE: HCPCS | Performed by: NEUROLOGICAL SURGERY

## 2019-08-05 PROCEDURE — 82565 ASSAY OF CREATININE: CPT

## 2019-08-05 PROCEDURE — 0 GADOBENATE DIMEGLUMINE 529 MG/ML SOLUTION: Performed by: NEUROLOGICAL SURGERY

## 2019-08-05 PROCEDURE — 70553 MRI BRAIN STEM W/O & W/DYE: CPT

## 2019-08-05 RX ADMIN — GADOBENATE DIMEGLUMINE 12 ML: 529 INJECTION, SOLUTION INTRAVENOUS at 08:13

## 2019-08-06 ENCOUNTER — TRANSCRIBE ORDERS (OUTPATIENT)
Dept: ADMINISTRATIVE | Facility: HOSPITAL | Age: 69
End: 2019-08-06

## 2019-08-06 DIAGNOSIS — R07.9 CHEST PAIN, UNSPECIFIED TYPE: Primary | ICD-10-CM

## 2019-08-06 LAB — CREAT BLDA-MCNC: 0.8 MG/DL (ref 0.6–1.3)

## 2019-08-19 ENCOUNTER — HOSPITAL ENCOUNTER (OUTPATIENT)
Dept: GENERAL RADIOLOGY | Facility: HOSPITAL | Age: 69
Discharge: HOME | End: 2019-08-19
Admitting: INTERNAL MEDICINE

## 2019-08-19 ENCOUNTER — TRANSCRIBE ORDERS (OUTPATIENT)
Dept: ADMINISTRATIVE | Facility: HOSPITAL | Age: 69
End: 2019-08-19

## 2019-08-19 DIAGNOSIS — M43.9 COMPRESSION DEFORMITY OF VERTEBRA: ICD-10-CM

## 2019-08-19 DIAGNOSIS — Z13.9 VISIT FOR SCREENING: ICD-10-CM

## 2019-08-19 DIAGNOSIS — M43.9 COMPRESSION DEFORMITY OF VERTEBRA: Primary | ICD-10-CM

## 2019-08-19 PROCEDURE — 72072 X-RAY EXAM THORAC SPINE 3VWS: CPT

## 2019-08-22 ENCOUNTER — HOSPITAL ENCOUNTER (OUTPATIENT)
Dept: NUCLEAR MEDICINE | Facility: HOSPITAL | Age: 69
Discharge: HOME | End: 2019-08-22

## 2019-08-22 DIAGNOSIS — R07.9 CHEST PAIN, UNSPECIFIED TYPE: ICD-10-CM

## 2019-08-22 LAB
BH CV STRESS BP STAGE 1: NORMAL
BH CV STRESS BP STAGE 2: NORMAL
BH CV STRESS DURATION MIN STAGE 1: 3
BH CV STRESS DURATION MIN STAGE 2: 3
BH CV STRESS DURATION SEC STAGE 1: 0
BH CV STRESS DURATION SEC STAGE 2: 0
BH CV STRESS GRADE STAGE 1: 10
BH CV STRESS GRADE STAGE 2: 12
BH CV STRESS HR STAGE 1: 106
BH CV STRESS HR STAGE 2: 132
BH CV STRESS METS STAGE 1: 5
BH CV STRESS METS STAGE 2: 7.5
BH CV STRESS PROTOCOL 1: NORMAL
BH CV STRESS RECOVERY BP: NORMAL MMHG
BH CV STRESS RECOVERY HR: 60 BPM
BH CV STRESS SPEED STAGE 1: 1.7
BH CV STRESS SPEED STAGE 2: 2.5
BH CV STRESS STAGE 1: 1
BH CV STRESS STAGE 2: 2
LV EF NUC BP: 71 %
MAXIMAL PREDICTED HEART RATE: 150 BPM
PERCENT MAX PREDICTED HR: 89.33 %
STRESS BASELINE BP: NORMAL MMHG
STRESS BASELINE HR: 61 BPM
STRESS PERCENT HR: 105 %
STRESS POST ESTIMATED WORKLOAD: 7.2 METS
STRESS POST EXERCISE DUR MIN: 6 MIN
STRESS POST EXERCISE DUR SEC: 0 SEC
STRESS POST PEAK BP: NORMAL MMHG
STRESS POST PEAK HR: 134 BPM
STRESS TARGET HR: 128 BPM

## 2019-08-22 PROCEDURE — 93016 CV STRESS TEST SUPVJ ONLY: CPT | Performed by: INTERNAL MEDICINE

## 2019-08-22 PROCEDURE — A9500 TC99M SESTAMIBI: HCPCS | Performed by: INTERNAL MEDICINE

## 2019-08-22 PROCEDURE — 93018 CV STRESS TEST I&R ONLY: CPT | Performed by: INTERNAL MEDICINE

## 2019-08-22 PROCEDURE — 78452 HT MUSCLE IMAGE SPECT MULT: CPT | Performed by: INTERNAL MEDICINE

## 2019-08-22 PROCEDURE — 0 TECHNETIUM SESTAMIBI: Performed by: INTERNAL MEDICINE

## 2019-08-22 PROCEDURE — 93017 CV STRESS TEST TRACING ONLY: CPT

## 2019-08-22 PROCEDURE — 78452 HT MUSCLE IMAGE SPECT MULT: CPT

## 2019-08-22 RX ORDER — EZETIMIBE 10 MG/1
1 TABLET ORAL DAILY
COMMUNITY
Start: 2019-07-03

## 2019-08-22 RX ORDER — ALENDRONATE SODIUM 70 MG/1
1 TABLET ORAL WEEKLY
Refills: 3 | COMMUNITY
Start: 2019-08-13 | End: 2023-03-17 | Stop reason: HOSPADM

## 2019-08-22 RX ORDER — PREDNISONE 1 MG/1
TABLET ORAL
Refills: 0 | COMMUNITY
Start: 2019-07-22 | End: 2023-03-17 | Stop reason: HOSPADM

## 2019-08-22 RX ORDER — PREDNISONE 20 MG/1
TABLET ORAL
Refills: 0 | COMMUNITY
Start: 2019-06-06 | End: 2023-03-17 | Stop reason: HOSPADM

## 2019-08-22 RX ADMIN — TECHNETIUM TC 99M SESTAMIBI 1 DOSE: 1 INJECTION INTRAVENOUS at 09:35

## 2019-08-22 RX ADMIN — TECHNETIUM TC 99M SESTAMIBI 1 DOSE: 1 INJECTION INTRAVENOUS at 07:00

## 2019-08-27 ENCOUNTER — OFFICE VISIT (OUTPATIENT)
Dept: NEUROSURGERY | Facility: CLINIC | Age: 69
End: 2019-08-27

## 2019-08-27 VITALS — SYSTOLIC BLOOD PRESSURE: 126 MMHG | HEART RATE: 68 BPM | DIASTOLIC BLOOD PRESSURE: 71 MMHG

## 2019-08-27 DIAGNOSIS — Q28.3 BRAIN VASCULAR MALFORMATION: Primary | ICD-10-CM

## 2019-08-27 PROCEDURE — 99213 OFFICE O/P EST LOW 20 MIN: CPT | Performed by: NEUROLOGICAL SURGERY

## 2019-08-27 NOTE — PROGRESS NOTES
Subjective   Patient ID: Tia Mcnulty is a 69 y.o. female is here today for follow-up with a new Brain MRI that was ordered at her last office visit 2/5/2019 for bilateral hearing loss and mild headaches.  Today her symptoms are bilateral hearing loss but her headaches have improved.    History of Present Illness     This patient returns today.  She is doing well overall.  She still has some hearing loss.    The following portions of the patient's history were reviewed and updated as appropriate: allergies, current medications, past family history, past medical history, past social history, past surgical history and problem list.    Review of Systems   HENT: Positive for hearing loss.    Respiratory: Negative for chest tightness and shortness of breath.    Cardiovascular: Negative for chest pain.   Neurological: Positive for light-headedness. Negative for headaches.   All other systems reviewed and are negative.      Objective   Physical Exam   Constitutional: She is oriented to person, place, and time. She appears well-developed and well-nourished.   Neurological: She is oriented to person, place, and time.     Neurologic Exam     Mental Status   Oriented to person, place, and time.       Assessment/Plan   Independent Review of Radiographic Studies:      I reviewed her MRI which shows no evidence of the tumor.    Medical Decision Making:      I told the patient about the imaging.  I told her that from my point of view there is nothing further we need to do.  She agrees and will call if anything else happens.    Tia was seen today for follow-up.    Diagnoses and all orders for this visit:    Brain vascular malformation      Return if symptoms worsen or fail to improve.

## 2019-09-05 ENCOUNTER — HOSPITAL ENCOUNTER (OUTPATIENT)
Dept: CARDIOLOGY | Facility: HOSPITAL | Age: 69
Discharge: HOME | End: 2019-09-05
Admitting: INTERNAL MEDICINE

## 2019-09-05 VITALS
BODY MASS INDEX: 22.15 KG/M2 | HEART RATE: 56 BPM | HEIGHT: 63 IN | WEIGHT: 125 LBS | SYSTOLIC BLOOD PRESSURE: 134 MMHG | DIASTOLIC BLOOD PRESSURE: 68 MMHG

## 2019-09-05 DIAGNOSIS — Z13.9 VISIT FOR SCREENING: ICD-10-CM

## 2019-09-05 LAB
BH CV ECHO MEAS - DIST AO DIAM: 1.37 CM
BH CV VAS BP LEFT ARM: NORMAL MMHG
BH CV VAS BP RIGHT ARM: NORMAL MMHG
BH CV XLRA MEAS - MID AO DIAM: 1.81 CM
BH CV XLRA MEAS - PAD LEFT ABI DP: 1.16
BH CV XLRA MEAS - PAD LEFT ABI PT: 1.22
BH CV XLRA MEAS - PAD LEFT ARM: 128 MMHG
BH CV XLRA MEAS - PAD LEFT LEG DP: 156 MMHG
BH CV XLRA MEAS - PAD LEFT LEG PT: 164 MMHG
BH CV XLRA MEAS - PAD RIGHT ABI DP: 1.19
BH CV XLRA MEAS - PAD RIGHT ABI PT: 1.25
BH CV XLRA MEAS - PAD RIGHT ARM: 134 MMHG
BH CV XLRA MEAS - PAD RIGHT LEG DP: 160 MMHG
BH CV XLRA MEAS - PAD RIGHT LEG PT: 168 MMHG
BH CV XLRA MEAS - PROX AO DIAM: 2.37 CM
BH CV XLRA MEAS LEFT ICA/CCA RATIO: 1.26
BH CV XLRA MEAS LEFT MID CCA PSV: NORMAL CM/SEC
BH CV XLRA MEAS LEFT MID ICA PSV: NORMAL CM/SEC
BH CV XLRA MEAS LEFT PROX ECA PSV: NORMAL CM/SEC
BH CV XLRA MEAS RIGHT ICA/CCA RATIO: 0.8
BH CV XLRA MEAS RIGHT MID CCA PSV: NORMAL CM/SEC
BH CV XLRA MEAS RIGHT MID ICA PSV: NORMAL CM/SEC
BH CV XLRA MEAS RIGHT PROX ECA PSV: NORMAL CM/SEC

## 2019-09-05 PROCEDURE — 93799 UNLISTED CV SVC/PROCEDURE: CPT

## 2020-08-19 ENCOUNTER — APPOINTMENT (OUTPATIENT)
Dept: WOMENS IMAGING | Facility: HOSPITAL | Age: 70
End: 2020-08-19

## 2020-08-19 PROCEDURE — 77067 SCR MAMMO BI INCL CAD: CPT | Performed by: RADIOLOGY

## 2020-08-19 PROCEDURE — 77063 BREAST TOMOSYNTHESIS BI: CPT | Performed by: RADIOLOGY

## 2021-02-15 ENCOUNTER — IMMUNIZATION (OUTPATIENT)
Dept: VACCINE CLINIC | Facility: HOSPITAL | Age: 71
End: 2021-02-15

## 2021-02-15 PROCEDURE — 91300 HC SARSCOV02 VAC 30MCG/0.3ML IM: CPT | Performed by: INTERNAL MEDICINE

## 2021-02-15 PROCEDURE — 0001A: CPT | Performed by: INTERNAL MEDICINE

## 2021-03-08 ENCOUNTER — IMMUNIZATION (OUTPATIENT)
Dept: VACCINE CLINIC | Facility: HOSPITAL | Age: 71
End: 2021-03-08

## 2021-03-08 PROCEDURE — 0002A: CPT | Performed by: INTERNAL MEDICINE

## 2021-03-08 PROCEDURE — 91300 HC SARSCOV02 VAC 30MCG/0.3ML IM: CPT | Performed by: INTERNAL MEDICINE

## 2021-05-19 ENCOUNTER — TELEPHONE (OUTPATIENT)
Dept: GASTROENTEROLOGY | Facility: CLINIC | Age: 71
End: 2021-05-19

## 2021-05-19 DIAGNOSIS — K63.5 POLYP OF COLON, UNSPECIFIED PART OF COLON, UNSPECIFIED TYPE: ICD-10-CM

## 2021-05-19 DIAGNOSIS — Z80.0 FAMILY HISTORY OF GI MALIGNANCY: Primary | ICD-10-CM

## 2021-05-19 NOTE — TELEPHONE ENCOUNTER
Last scope 01/18/2013-- personal hx of polyps-- family hx of colon ca-- no ASA or blood thinners-- medications:    B Complex Vitamins (VITAMIN B COMPLEX PO)  Cholecalciferol (VITAMIN D-3 PO)  ezetimibe (ZETIA) 10 MG tablet  Vitamin C     OA form and last scope scanned into media

## 2021-05-20 ENCOUNTER — TELEPHONE (OUTPATIENT)
Dept: GASTROENTEROLOGY | Facility: CLINIC | Age: 71
End: 2021-05-20

## 2021-06-02 ENCOUNTER — OUTSIDE FACILITY SERVICE (OUTPATIENT)
Dept: GASTROENTEROLOGY | Facility: CLINIC | Age: 71
End: 2021-06-02

## 2021-06-02 PROCEDURE — G0105 COLORECTAL SCRN; HI RISK IND: HCPCS | Performed by: INTERNAL MEDICINE

## 2021-08-20 ENCOUNTER — APPOINTMENT (OUTPATIENT)
Dept: WOMENS IMAGING | Facility: HOSPITAL | Age: 71
End: 2021-08-20

## 2021-08-20 PROCEDURE — 77063 BREAST TOMOSYNTHESIS BI: CPT | Performed by: RADIOLOGY

## 2021-08-20 PROCEDURE — 77080 DXA BONE DENSITY AXIAL: CPT | Performed by: RADIOLOGY

## 2021-08-20 PROCEDURE — 77067 SCR MAMMO BI INCL CAD: CPT | Performed by: RADIOLOGY

## 2021-09-29 ENCOUNTER — TRANSCRIBE ORDERS (OUTPATIENT)
Dept: ADMINISTRATIVE | Facility: HOSPITAL | Age: 71
End: 2021-09-29

## 2021-09-29 ENCOUNTER — LAB (OUTPATIENT)
Dept: LAB | Facility: HOSPITAL | Age: 71
End: 2021-09-29

## 2021-09-29 DIAGNOSIS — B97.89 VIRAL TONSILLITIS: ICD-10-CM

## 2021-09-29 DIAGNOSIS — J03.80 VIRAL TONSILLITIS: ICD-10-CM

## 2021-09-29 DIAGNOSIS — R35.0 URINARY FREQUENCY: Primary | ICD-10-CM

## 2021-09-29 DIAGNOSIS — Z20.822 EXPOSURE TO COVID-19 VIRUS: ICD-10-CM

## 2021-09-29 DIAGNOSIS — R35.0 URINARY FREQUENCY: ICD-10-CM

## 2021-09-29 LAB
BACTERIA UR QL AUTO: ABNORMAL /HPF
BILIRUB UR QL STRIP: NEGATIVE
CLARITY UR: CLEAR
COLOR UR: YELLOW
GLUCOSE UR STRIP-MCNC: NEGATIVE MG/DL
HGB UR QL STRIP.AUTO: ABNORMAL
HYALINE CASTS UR QL AUTO: ABNORMAL /LPF
KETONES UR QL STRIP: NEGATIVE
LEUKOCYTE ESTERASE UR QL STRIP.AUTO: NEGATIVE
NITRITE UR QL STRIP: NEGATIVE
PH UR STRIP.AUTO: 6 [PH] (ref 5–8)
PROT UR QL STRIP: NEGATIVE
RBC # UR: ABNORMAL /HPF
REF LAB TEST METHOD: ABNORMAL
SARS-COV-2 ORF1AB RESP QL NAA+PROBE: NOT DETECTED
SP GR UR STRIP: 1.01 (ref 1–1.03)
SQUAMOUS #/AREA URNS HPF: ABNORMAL /HPF
UROBILINOGEN UR QL STRIP: ABNORMAL
WBC UR QL AUTO: ABNORMAL /HPF

## 2021-09-29 PROCEDURE — 87086 URINE CULTURE/COLONY COUNT: CPT

## 2021-09-29 PROCEDURE — C9803 HOPD COVID-19 SPEC COLLECT: HCPCS

## 2021-09-29 PROCEDURE — 81001 URINALYSIS AUTO W/SCOPE: CPT

## 2021-09-29 PROCEDURE — U0004 COV-19 TEST NON-CDC HGH THRU: HCPCS

## 2021-09-30 ENCOUNTER — HOSPITAL ENCOUNTER (OUTPATIENT)
Dept: GENERAL RADIOLOGY | Facility: HOSPITAL | Age: 71
Discharge: HOME | End: 2021-09-30
Admitting: ORTHOPAEDIC SURGERY

## 2021-09-30 ENCOUNTER — LAB (OUTPATIENT)
Dept: LAB | Facility: HOSPITAL | Age: 71
End: 2021-09-30

## 2021-09-30 ENCOUNTER — TRANSCRIBE ORDERS (OUTPATIENT)
Dept: ADMINISTRATIVE | Facility: HOSPITAL | Age: 71
End: 2021-09-30

## 2021-09-30 ENCOUNTER — HOSPITAL ENCOUNTER (OUTPATIENT)
Dept: CARDIOLOGY | Facility: HOSPITAL | Age: 71
Discharge: HOME | End: 2021-09-30

## 2021-09-30 DIAGNOSIS — Z01.818 PRE-OP EXAM: Primary | ICD-10-CM

## 2021-09-30 DIAGNOSIS — Z01.818 PRE-OP EXAM: ICD-10-CM

## 2021-09-30 DIAGNOSIS — M17.11 OSTEOARTHRITIS OF RIGHT KNEE, UNSPECIFIED OSTEOARTHRITIS TYPE: ICD-10-CM

## 2021-09-30 LAB
ALBUMIN SERPL-MCNC: 4.4 G/DL (ref 3.5–5.2)
ALBUMIN/GLOB SERPL: 1.7 G/DL
ALP SERPL-CCNC: 62 U/L (ref 39–117)
ALT SERPL W P-5'-P-CCNC: 20 U/L (ref 1–33)
ANION GAP SERPL CALCULATED.3IONS-SCNC: 9.9 MMOL/L (ref 5–15)
AST SERPL-CCNC: 20 U/L (ref 1–32)
BACTERIA SPEC AEROBE CULT: NO GROWTH
BASOPHILS # BLD AUTO: 0.06 10*3/MM3 (ref 0–0.2)
BASOPHILS NFR BLD AUTO: 0.9 % (ref 0–1.5)
BILIRUB SERPL-MCNC: 0.4 MG/DL (ref 0–1.2)
BUN SERPL-MCNC: 9 MG/DL (ref 8–23)
BUN/CREAT SERPL: 12.2 (ref 7–25)
CALCIUM SPEC-SCNC: 9.6 MG/DL (ref 8.6–10.5)
CHLORIDE SERPL-SCNC: 102 MMOL/L (ref 98–107)
CO2 SERPL-SCNC: 25.1 MMOL/L (ref 22–29)
CREAT SERPL-MCNC: 0.74 MG/DL (ref 0.57–1)
DEPRECATED RDW RBC AUTO: 43.5 FL (ref 37–54)
EOSINOPHIL # BLD AUTO: 0.09 10*3/MM3 (ref 0–0.4)
EOSINOPHIL NFR BLD AUTO: 1.3 % (ref 0.3–6.2)
ERYTHROCYTE [DISTWIDTH] IN BLOOD BY AUTOMATED COUNT: 11.9 % (ref 12.3–15.4)
GFR SERPL CREATININE-BSD FRML MDRD: 77 ML/MIN/1.73
GLOBULIN UR ELPH-MCNC: 2.6 GM/DL
GLUCOSE SERPL-MCNC: 137 MG/DL (ref 65–99)
HCT VFR BLD AUTO: 38.4 % (ref 34–46.6)
HGB BLD-MCNC: 13 G/DL (ref 12–15.9)
IMM GRANULOCYTES # BLD AUTO: 0.02 10*3/MM3 (ref 0–0.05)
IMM GRANULOCYTES NFR BLD AUTO: 0.3 % (ref 0–0.5)
INR PPP: 0.96 (ref 0.9–1.1)
LYMPHOCYTES # BLD AUTO: 1.8 10*3/MM3 (ref 0.7–3.1)
LYMPHOCYTES NFR BLD AUTO: 26.2 % (ref 19.6–45.3)
MCH RBC QN AUTO: 33.6 PG (ref 26.6–33)
MCHC RBC AUTO-ENTMCNC: 33.9 G/DL (ref 31.5–35.7)
MCV RBC AUTO: 99.2 FL (ref 79–97)
MONOCYTES # BLD AUTO: 0.51 10*3/MM3 (ref 0.1–0.9)
MONOCYTES NFR BLD AUTO: 7.4 % (ref 5–12)
NEUTROPHILS NFR BLD AUTO: 4.39 10*3/MM3 (ref 1.7–7)
NEUTROPHILS NFR BLD AUTO: 63.9 % (ref 42.7–76)
NRBC BLD AUTO-RTO: 0 /100 WBC (ref 0–0.2)
PLATELET # BLD AUTO: 317 10*3/MM3 (ref 140–450)
PMV BLD AUTO: 9.8 FL (ref 6–12)
POTASSIUM SERPL-SCNC: 4.1 MMOL/L (ref 3.5–5.2)
PROT SERPL-MCNC: 7 G/DL (ref 6–8.5)
PROTHROMBIN TIME: 12.5 SECONDS (ref 11.7–14.2)
RBC # BLD AUTO: 3.87 10*6/MM3 (ref 3.77–5.28)
SODIUM SERPL-SCNC: 137 MMOL/L (ref 136–145)
WBC # BLD AUTO: 6.87 10*3/MM3 (ref 3.4–10.8)

## 2021-09-30 PROCEDURE — 80053 COMPREHEN METABOLIC PANEL: CPT

## 2021-09-30 PROCEDURE — 71046 X-RAY EXAM CHEST 2 VIEWS: CPT

## 2021-09-30 PROCEDURE — 93010 ELECTROCARDIOGRAM REPORT: CPT | Performed by: INTERNAL MEDICINE

## 2021-09-30 PROCEDURE — 93005 ELECTROCARDIOGRAM TRACING: CPT | Performed by: ORTHOPAEDIC SURGERY

## 2021-09-30 PROCEDURE — 36415 COLL VENOUS BLD VENIPUNCTURE: CPT

## 2021-09-30 PROCEDURE — 85025 COMPLETE CBC W/AUTO DIFF WBC: CPT

## 2021-09-30 PROCEDURE — 85610 PROTHROMBIN TIME: CPT

## 2021-10-05 LAB — QT INTERVAL: 381 MS

## 2021-10-08 ENCOUNTER — TRANSCRIBE ORDERS (OUTPATIENT)
Dept: ADMINISTRATIVE | Facility: HOSPITAL | Age: 71
End: 2021-10-08

## 2021-10-08 DIAGNOSIS — R31.21 ASYMPTOMATIC MICROSCOPIC HEMATURIA: Primary | ICD-10-CM

## 2021-10-14 ENCOUNTER — HOME HEALTH ADMISSION (OUTPATIENT)
Dept: HOME HEALTH SERVICES | Facility: HOME HEALTHCARE | Age: 71
End: 2021-10-14

## 2021-10-14 ENCOUNTER — TRANSCRIBE ORDERS (OUTPATIENT)
Dept: HOME HEALTH SERVICES | Facility: HOME HEALTHCARE | Age: 71
End: 2021-10-14

## 2021-10-14 DIAGNOSIS — Z96.651 STATUS POST TOTAL RIGHT KNEE REPLACEMENT: Primary | ICD-10-CM

## 2021-10-15 ENCOUNTER — HOME CARE VISIT (OUTPATIENT)
Dept: HOME HEALTH SERVICES | Facility: HOME HEALTHCARE | Age: 71
End: 2021-10-15

## 2021-10-15 VITALS
DIASTOLIC BLOOD PRESSURE: 62 MMHG | HEART RATE: 64 BPM | SYSTOLIC BLOOD PRESSURE: 128 MMHG | RESPIRATION RATE: 18 BRPM | TEMPERATURE: 97.7 F | OXYGEN SATURATION: 96 %

## 2021-10-15 PROCEDURE — G0151 HHCP-SERV OF PT,EA 15 MIN: HCPCS

## 2021-10-15 NOTE — HOME HEALTH
"PHYSICAL THERAPY KNEE ARTHROPLASTY EVALUATION    REASON FOR REFERRAL: 71 year old female admitted to [facility] on 10/13/21 for elective Right total knee arthroplasty.  She was discharged home on 10/14/21 & referred for home health PT services for surgical aftercare of Right TKA, resulting in decreased range of motion and strength in Right knee, impeding functional mobility and gait activities, which prevents patient from safely exiting home for medical appointments.    HOME ENVIRONMENT:  Lives with supportive  & cat in 2 story home with 5 steps + rail. Bedroom & shower on 2nd level with 12 steps with single rail  Laundry in basement with 14 steps with single rail.    PRIOR LEVEL OF FUNCTION:  Independent without assistive device.  Driving.    PATIENT GOAL FOR THIS EPISODE OF CARE:  \"To be able to walk comfortably, flex her knee & get back to normal function\"    MENTAL STATUS:  Alert and oriented x 4    PAIN RATING:         LOCATION: Right Knee         AT BEST:   1-2/10        AT WORST:   10/10        AT PRESENT:   3-4/10        INCREASES WITH:  End range of motion knee flexion, Active knee extension        CONTROLLED WITH:  Narcotics, ice, rest, elevation    EDEMA: Moderate Right knee.  Calf is soft, not tender.  Alexandria's negative.    WOUND / SKIN CONDITION: Moderate serosanguinous drainage on island dressing covering Right knee incision secured with zipline.  Bruising noted along lateral aspect of knee & on thigh.      SIGNS SYMPTOMS OF INFECTION:   None    POSTURE:  No gross deformities    MUSCLE TONE/COORDINATION:   WNL                                 TIMED UP AND GO score: 20  seconds- indicates SLOWER MOBILITY  (TUG Mobility: High > 10 sec/Typical 10-19/Slower 20-29/Diminished>30)    ASSESSMENT:  Pt s/p (R) TKA.  She is functioning well & tolerating Tramadol much better than Hydrocodone.  Pt had been navigating 8.5\" steps multiple times per day & was instructed to limit stairs to " once or twice daily at this time.     PLAN FOR NEXT VISIT:  Progress HEP, strive to increase (R) Knee ROM, progress gt training with improvement in heel-toe gt pattern.

## 2021-10-18 ENCOUNTER — HOSPITAL ENCOUNTER (OUTPATIENT)
Dept: ULTRASOUND IMAGING | Facility: HOSPITAL | Age: 71
Discharge: HOME | End: 2021-10-18
Admitting: INTERNAL MEDICINE

## 2021-10-18 ENCOUNTER — HOME CARE VISIT (OUTPATIENT)
Dept: HOME HEALTH SERVICES | Facility: HOME HEALTHCARE | Age: 71
End: 2021-10-18

## 2021-10-18 VITALS
OXYGEN SATURATION: 98 % | HEART RATE: 78 BPM | DIASTOLIC BLOOD PRESSURE: 64 MMHG | SYSTOLIC BLOOD PRESSURE: 132 MMHG | TEMPERATURE: 97.6 F

## 2021-10-18 DIAGNOSIS — R31.21 ASYMPTOMATIC MICROSCOPIC HEMATURIA: ICD-10-CM

## 2021-10-18 PROCEDURE — 76775 US EXAM ABDO BACK WALL LIM: CPT

## 2021-10-18 PROCEDURE — G0157 HHC PT ASSISTANT EA 15: HCPCS

## 2021-10-18 NOTE — HOME HEALTH
Subjective: I think I may have overdid it yesterday. I  have a bowel movement yesterday    Wound: right knee covered with Island dressing  and Zipline intact . Island dressing changed with clean technique and replaced with new  Island dressing    Assessment:Patient wanted to walk outside and was able to move self across the uneven grass surfaces with walker and CGA/SBA. Patient able to review HEP and improved on ROM    Plan for next visit/Communication  Increase ROM  gait training  steps  HEP with progressing to standing

## 2021-10-20 ENCOUNTER — HOME CARE VISIT (OUTPATIENT)
Dept: HOME HEALTH SERVICES | Facility: HOME HEALTHCARE | Age: 71
End: 2021-10-20

## 2021-10-20 VITALS
HEART RATE: 105 BPM | DIASTOLIC BLOOD PRESSURE: 70 MMHG | OXYGEN SATURATION: 96 % | SYSTOLIC BLOOD PRESSURE: 124 MMHG | TEMPERATURE: 97.4 F

## 2021-10-20 PROCEDURE — G0157 HHC PT ASSISTANT EA 15: HCPCS

## 2021-10-20 NOTE — HOME HEALTH
Subjective:  I haven't heard from the [facility] people yet about my walker    Wound: right knee covered with Island dressing and Zipline intact . Island dressing changed with clean technique and replaced with new Island dressing     Assessment:Patient wanted to walk outside and was able to move self across the uneven grass surfaces with walker and SBA. Patient able to review and progress with HEP and improved on ROM . Therapist sent In Basket to Dr. Lopez about getting patient a walker, the one she has the wheels don't roll well. No reply or order at this time. I called Dr. Tomas office and talked to [place] today and she will get an order sent over today.    Plan for next visit/Communication   Increase ROM   gait training   steps   HEP with progressing to standing

## 2021-10-23 ENCOUNTER — HOME CARE VISIT (OUTPATIENT)
Dept: HOME HEALTH SERVICES | Facility: HOME HEALTHCARE | Age: 71
End: 2021-10-23

## 2021-10-23 VITALS
TEMPERATURE: 97.7 F | OXYGEN SATURATION: 93 % | HEART RATE: 58 BPM | SYSTOLIC BLOOD PRESSURE: 122 MMHG | DIASTOLIC BLOOD PRESSURE: 68 MMHG

## 2021-10-23 PROCEDURE — G0157 HHC PT ASSISTANT EA 15: HCPCS

## 2021-10-23 NOTE — HOME HEALTH
Subjective: I feel like I went backwards a little bit the last couple of days of trying to get my pain pills and my new rollator    Wound: Zipline intact. healing nicely    Assessment:Therapist and Patient made multiple calls to doctor and [place] and she finally got hard copy of prescription and went and picked up Rollator without back wheels to manuever across grass uneven surfaces safer than with borrowed walker. She demonstrated using cane in the home with no LOB.  Patient will be home alone most of the time now and education on carrying phone with her. she appears safe on steps and ramp to exit the home.    Plan for next visit/Communication  trial cane outside if able  increase ROM  standing HEP  balance assessments

## 2021-10-26 ENCOUNTER — HOME CARE VISIT (OUTPATIENT)
Dept: HOME HEALTH SERVICES | Facility: HOME HEALTHCARE | Age: 71
End: 2021-10-26

## 2021-10-26 VITALS
SYSTOLIC BLOOD PRESSURE: 160 MMHG | TEMPERATURE: 97.8 F | RESPIRATION RATE: 18 BRPM | DIASTOLIC BLOOD PRESSURE: 80 MMHG | OXYGEN SATURATION: 98 % | HEART RATE: 71 BPM

## 2021-10-26 PROCEDURE — G0151 HHCP-SERV OF PT,EA 15 MIN: HCPCS

## 2021-10-26 NOTE — HOME HEALTH
_____________________________________________________________  PHYSICAL THERAPY DISCHARGE VISIT SUMMARY    SUBJECTIVE:  Patient states she forgot about rocking in the rocker & finds it much more tolerable than trying to force her knee to bend.      MEDICATION CHANGES:  None    FALLS SINCE LAST VISIT:  None    MENTAL STATUS: Alert and oriented x 4     PAIN RATING:   LOCATION: Right Knee   AT BEST: 1-2/10   AT WORST: 7/10   AT PRESENT: 3-4/10   INCREASES WITH: End range of motion knee flexion, Active knee extension   CONTROLLED WITH: Narcotics, ice, rest, elevation     EDEMA: Mild/moderate Right knee. Calf is soft, Alexandria's negative.     WOUND / SKIN CONDITION:  14.4 cm Right knee incision healing well.  Zipline removed without difficulty.  Pt tolerated well.    SIGNS SYMPTOMS OF INFECTION: None    DISCHARGE STATUS:  Home to self care & outpatient therapy    DISCHARGE CONDITION:  Good    INSTRUCTIONS HOME PROGRAM PROVIDED:  Yes          CONTENT: Written/pictoral Home exercise program          PATIENT/CAREGIVER LEVEL OF COMPLIANCE:  Good    UNMET NEEDS: None    HOME HEALTH SERVICES CONTINUING:  None

## 2022-08-22 ENCOUNTER — APPOINTMENT (OUTPATIENT)
Dept: WOMENS IMAGING | Facility: HOSPITAL | Age: 72
End: 2022-08-22

## 2022-08-22 PROCEDURE — 77063 BREAST TOMOSYNTHESIS BI: CPT | Performed by: RADIOLOGY

## 2022-08-22 PROCEDURE — 77067 SCR MAMMO BI INCL CAD: CPT | Performed by: RADIOLOGY

## 2023-03-16 ENCOUNTER — APPOINTMENT (OUTPATIENT)
Dept: CT IMAGING | Facility: HOSPITAL | Age: 73
End: 2023-03-16
Payer: MEDICARE

## 2023-03-16 ENCOUNTER — HOSPITAL ENCOUNTER (OUTPATIENT)
Facility: HOSPITAL | Age: 73
Setting detail: OBSERVATION
Discharge: HOME | End: 2023-03-17
Attending: EMERGENCY MEDICINE | Admitting: EMERGENCY MEDICINE
Payer: MEDICARE

## 2023-03-16 ENCOUNTER — APPOINTMENT (OUTPATIENT)
Dept: GENERAL RADIOLOGY | Facility: HOSPITAL | Age: 73
End: 2023-03-16
Payer: MEDICARE

## 2023-03-16 DIAGNOSIS — R55 SYNCOPE, UNSPECIFIED SYNCOPE TYPE: Primary | ICD-10-CM

## 2023-03-16 LAB
ALBUMIN SERPL-MCNC: 4.3 G/DL (ref 3.5–5.2)
ALBUMIN/GLOB SERPL: 1.4 G/DL
ALP SERPL-CCNC: 72 U/L (ref 39–117)
ALT SERPL W P-5'-P-CCNC: 16 U/L (ref 1–33)
ANION GAP SERPL CALCULATED.3IONS-SCNC: 12.9 MMOL/L (ref 5–15)
AST SERPL-CCNC: 22 U/L (ref 1–32)
BACTERIA UR QL AUTO: ABNORMAL /HPF
BASOPHILS # BLD AUTO: 0.05 10*3/MM3 (ref 0–0.2)
BASOPHILS NFR BLD AUTO: 0.3 % (ref 0–1.5)
BILIRUB SERPL-MCNC: 0.5 MG/DL (ref 0–1.2)
BILIRUB UR QL STRIP: NEGATIVE
BUN SERPL-MCNC: 12 MG/DL (ref 8–23)
BUN/CREAT SERPL: 14.5 (ref 7–25)
CALCIUM SPEC-SCNC: 10 MG/DL (ref 8.6–10.5)
CHLORIDE SERPL-SCNC: 99 MMOL/L (ref 98–107)
CLARITY UR: CLEAR
CO2 SERPL-SCNC: 25.1 MMOL/L (ref 22–29)
COLOR UR: YELLOW
CREAT SERPL-MCNC: 0.83 MG/DL (ref 0.57–1)
DEPRECATED RDW RBC AUTO: 43.9 FL (ref 37–54)
EGFRCR SERPLBLD CKD-EPI 2021: 74.5 ML/MIN/1.73
EOSINOPHIL # BLD AUTO: 0.01 10*3/MM3 (ref 0–0.4)
EOSINOPHIL NFR BLD AUTO: 0.1 % (ref 0.3–6.2)
ERYTHROCYTE [DISTWIDTH] IN BLOOD BY AUTOMATED COUNT: 12 % (ref 12.3–15.4)
GEN 5 2HR TROPONIN T REFLEX: 10 NG/L
GLOBULIN UR ELPH-MCNC: 3.1 GM/DL
GLUCOSE SERPL-MCNC: 115 MG/DL (ref 65–99)
GLUCOSE UR STRIP-MCNC: NEGATIVE MG/DL
HCT VFR BLD AUTO: 40.6 % (ref 34–46.6)
HGB BLD-MCNC: 13.8 G/DL (ref 12–15.9)
HGB UR QL STRIP.AUTO: ABNORMAL
HYALINE CASTS UR QL AUTO: ABNORMAL /LPF
IMM GRANULOCYTES # BLD AUTO: 0.05 10*3/MM3 (ref 0–0.05)
IMM GRANULOCYTES NFR BLD AUTO: 0.3 % (ref 0–0.5)
KETONES UR QL STRIP: ABNORMAL
LEUKOCYTE ESTERASE UR QL STRIP.AUTO: ABNORMAL
LYMPHOCYTES # BLD AUTO: 0.85 10*3/MM3 (ref 0.7–3.1)
LYMPHOCYTES NFR BLD AUTO: 5.3 % (ref 19.6–45.3)
MCH RBC QN AUTO: 33.7 PG (ref 26.6–33)
MCHC RBC AUTO-ENTMCNC: 34 G/DL (ref 31.5–35.7)
MCV RBC AUTO: 99.3 FL (ref 79–97)
MONOCYTES # BLD AUTO: 0.84 10*3/MM3 (ref 0.1–0.9)
MONOCYTES NFR BLD AUTO: 5.3 % (ref 5–12)
NEUTROPHILS NFR BLD AUTO: 14.17 10*3/MM3 (ref 1.7–7)
NEUTROPHILS NFR BLD AUTO: 88.7 % (ref 42.7–76)
NITRITE UR QL STRIP: NEGATIVE
NRBC BLD AUTO-RTO: 0 /100 WBC (ref 0–0.2)
NT-PROBNP SERPL-MCNC: 247 PG/ML (ref 0–900)
PH UR STRIP.AUTO: 6.5 [PH] (ref 5–8)
PLATELET # BLD AUTO: 316 10*3/MM3 (ref 140–450)
PMV BLD AUTO: 9.5 FL (ref 6–12)
POTASSIUM SERPL-SCNC: 4.6 MMOL/L (ref 3.5–5.2)
PROT SERPL-MCNC: 7.4 G/DL (ref 6–8.5)
PROT UR QL STRIP: NEGATIVE
QT INTERVAL: 390 MS
RBC # BLD AUTO: 4.09 10*6/MM3 (ref 3.77–5.28)
RBC # UR STRIP: ABNORMAL /HPF
REF LAB TEST METHOD: ABNORMAL
SODIUM SERPL-SCNC: 137 MMOL/L (ref 136–145)
SP GR UR STRIP: 1.01 (ref 1–1.03)
SQUAMOUS #/AREA URNS HPF: ABNORMAL /HPF
TROPONIN T DELTA: 1 NG/L
TROPONIN T SERPL HS-MCNC: 9 NG/L
TROPONIN T SERPL HS-MCNC: 9 NG/L
UROBILINOGEN UR QL STRIP: ABNORMAL
WBC # UR STRIP: ABNORMAL /HPF
WBC NRBC COR # BLD: 15.97 10*3/MM3 (ref 3.4–10.8)

## 2023-03-16 PROCEDURE — G0378 HOSPITAL OBSERVATION PER HR: HCPCS

## 2023-03-16 PROCEDURE — 80053 COMPREHEN METABOLIC PANEL: CPT | Performed by: EMERGENCY MEDICINE

## 2023-03-16 PROCEDURE — 70450 CT HEAD/BRAIN W/O DYE: CPT

## 2023-03-16 PROCEDURE — 81001 URINALYSIS AUTO W/SCOPE: CPT | Performed by: NURSE PRACTITIONER

## 2023-03-16 PROCEDURE — 99285 EMERGENCY DEPT VISIT HI MDM: CPT

## 2023-03-16 PROCEDURE — 93010 ELECTROCARDIOGRAM REPORT: CPT | Performed by: INTERNAL MEDICINE

## 2023-03-16 PROCEDURE — 83880 ASSAY OF NATRIURETIC PEPTIDE: CPT | Performed by: EMERGENCY MEDICINE

## 2023-03-16 PROCEDURE — 71045 X-RAY EXAM CHEST 1 VIEW: CPT

## 2023-03-16 PROCEDURE — 93005 ELECTROCARDIOGRAM TRACING: CPT | Performed by: EMERGENCY MEDICINE

## 2023-03-16 PROCEDURE — 84484 ASSAY OF TROPONIN QUANT: CPT | Performed by: EMERGENCY MEDICINE

## 2023-03-16 PROCEDURE — 85025 COMPLETE CBC W/AUTO DIFF WBC: CPT | Performed by: EMERGENCY MEDICINE

## 2023-03-16 RX ORDER — ACETAMINOPHEN 325 MG/1
325 TABLET ORAL EVERY 4 HOURS PRN
Status: DISCONTINUED | OUTPATIENT
Start: 2023-03-16 | End: 2023-03-17 | Stop reason: HOSPADM

## 2023-03-16 RX ORDER — ASCORBIC ACID 500 MG
500 TABLET ORAL DAILY
Status: DISCONTINUED | OUTPATIENT
Start: 2023-03-16 | End: 2023-03-17 | Stop reason: HOSPADM

## 2023-03-16 RX ORDER — SODIUM CHLORIDE 0.9 % (FLUSH) 0.9 %
10 SYRINGE (ML) INJECTION EVERY 12 HOURS SCHEDULED
Status: DISCONTINUED | OUTPATIENT
Start: 2023-03-16 | End: 2023-03-17 | Stop reason: HOSPADM

## 2023-03-16 RX ORDER — SODIUM CHLORIDE 0.9 % (FLUSH) 0.9 %
10 SYRINGE (ML) INJECTION AS NEEDED
Status: DISCONTINUED | OUTPATIENT
Start: 2023-03-16 | End: 2023-03-17 | Stop reason: HOSPADM

## 2023-03-16 RX ORDER — SODIUM CHLORIDE 9 MG/ML
40 INJECTION, SOLUTION INTRAVENOUS AS NEEDED
Status: DISCONTINUED | OUTPATIENT
Start: 2023-03-16 | End: 2023-03-17 | Stop reason: HOSPADM

## 2023-03-16 RX ORDER — NITROGLYCERIN 0.4 MG/1
0.4 TABLET SUBLINGUAL
Status: DISCONTINUED | OUTPATIENT
Start: 2023-03-16 | End: 2023-03-17 | Stop reason: HOSPADM

## 2023-03-16 RX ADMIN — Medication 10 ML: at 21:00

## 2023-03-16 RX ADMIN — Medication 10 ML: at 14:50

## 2023-03-16 NOTE — ED TRIAGE NOTES
Pt had a syncopal episode thi am and when she woke she has urinated on herself.  She was sweating.  Denies injuries.  She reports she is feeling lightheaded    Patient was placed in face mask during first look triage.  Patient was wearing a face mask throughout encounter.  I wore personal protective equipment throughout the encounter.  Hand hygiene was performed before and after patient encounter.

## 2023-03-16 NOTE — ED NOTES
Nursing report ED to floor  Tia Mcnulty  72 y.o.  female    HPI :   Chief Complaint   Patient presents with    Syncope       Admitting doctor:   Deshaun Newberry MD    Admitting diagnosis:   The encounter diagnosis was Syncope, unspecified syncope type.    Code status:   Current Code Status       Date Active Code Status Order ID Comments User Context       3/16/2023 1511 CPR (Attempt to Resuscitate) [id number]  Allyssa Arango APRN ED        Question Answer    Code Status (Patient has no pulse and is not breathing) CPR (Attempt to Resuscitate)    Medical Interventions (Patient has pulse or is breathing) Full Support    Level Of Support Discussed With Patient                    Allergies:   Patient has no known allergies.    Isolation:   No active isolations    Intake and Output  No intake or output data in the 24 hours ending 03/16/23 1533    Weight:       03/16/23  1056   Weight: 57.6 kg (127 lb)       Most recent vitals:   Vitals:    03/16/23 1401 03/16/23 1431 03/16/23 1449 03/16/23 1450   BP: 131/60 133/69     Pulse: 60 76 60 67   Resp:       Temp:       TempSrc:       SpO2: 96% 95% 97% 97%   Weight:       Height:           Active LDAs/IV Access:   Lines, Drains & Airways       Active LDAs       Name Placement date Placement time Site Days    Peripheral IV 03/16/23 1123 Right Antecubital 03/16/23  1123  Antecubital  less than 1                    Labs (abnormal labs have a star):   Labs Reviewed   COMPREHENSIVE METABOLIC PANEL - Abnormal; Notable for the following components:       Result Value    Glucose 115 (*)     All other components within normal limits    Narrative:     GFR Normal >60  Chronic Kidney Disease <60  Kidney Failure <15    The GFR formula is only valid for adults with stable renal function between ages 18 and 70.   CBC WITH AUTO DIFFERENTIAL - Abnormal; Notable for the following components:    WBC 15.97 (*)     MCV 99.3 (*)     MCH 33.7 (*)     RDW 12.0 (*)     Neutrophil % 88.7 (*)      Lymphocyte % 5.3 (*)     Eosinophil % 0.1 (*)     Neutrophils, Absolute 14.17 (*)     All other components within normal limits   SINGLE HSTROPONIN T - Normal    Narrative:     High Sensitive Troponin T Reference Range:  <10.0 ng/L- Negative Female for AMI  <15.0 ng/L- Negative Male for AMI  >=10 - Abnormal Female indicating possible myocardial injury.  >=15 - Abnormal Male indicating possible myocardial injury.   Clinicians would have to utilize clinical acumen, EKG, Troponin, and serial changes to determine if it is an Acute Myocardial Infarction or myocardial injury due to an underlying chronic condition.        TROPONIN - Normal    Narrative:     High Sensitive Troponin T Reference Range:  <10.0 ng/L- Negative Female for AMI  <15.0 ng/L- Negative Male for AMI  >=10 - Abnormal Female indicating possible myocardial injury.  >=15 - Abnormal Male indicating possible myocardial injury.   Clinicians would have to utilize clinical acumen, EKG, Troponin, and serial changes to determine if it is an Acute Myocardial Infarction or myocardial injury due to an underlying chronic condition.        BNP (IN-HOUSE) - Normal    Narrative:     Among patients with dyspnea, NT-proBNP is highly sensitive for the detection of acute congestive heart failure. In addition NT-proBNP of <300 pg/ml effectively rules out acute congestive heart failure with 99% negative predictive value.    Results may be falsely decreased if patient taking Biotin.     HIGH SENSITIVITIY TROPONIN T 2HR   URINALYSIS W/ CULTURE IF INDICATED   CBC AND DIFFERENTIAL    Narrative:     The following orders were created for panel order CBC & Differential.  Procedure                               Abnormality         Status                     ---------                               -----------         ------                     CBC Auto Differential[[id number]]        Abnormal            Final result                 Please view results for these tests on the individual  orders.       EKG:   ECG 12 Lead Syncope   Final Result   HEART RATE= 73  bpm   RR Interval= 822  ms   OH Interval= 155  ms   P Horizontal Axis= -3  deg   P Front Axis= 78  deg   QRSD Interval= 87  ms   QT Interval= 390  ms   QRS Axis= 51  deg   T Wave Axis= 31  deg   - ABNORMAL ECG -   Sinus rhythm   Atrial premature complexes   Probable left atrial enlargement   No change from previous tracing   Electronically Signed By: Jayant Pinto ([facility]) 16-Mar-2023 13:56:08   Date and Time of Study: 2023-03-16 11:19:34          Meds given in ED:   Medications   sodium chloride 0.9 % flush 10 mL (has no administration in time range)   sodium chloride 0.9 % flush 10 mL (10 mL Intravenous Given 3/16/23 1450)   sodium chloride 0.9 % flush 10 mL (has no administration in time range)   sodium chloride 0.9 % infusion 40 mL (has no administration in time range)   nitroglycerin (NITROSTAT) SL tablet 0.4 mg (has no administration in time range)       Imaging results:  CT Head Without Contrast    Result Date: 3/16/2023  No acute process is identified. Further evaluation could be performed with an MRI examination of the brain, particularly if the patient has a history of new onset seizure activity.   Radiation dose reduction techniques were utilized, including automated exposure control and exposure modulation based on body size.  This report was finalized on 3/16/2023 2:05 PM by Dr. Lb Jerome M.D.      XR Chest 1 View    Result Date: 3/16/2023  No evidence for acute pulmonary process. Follow-up as clinical indications persist.  This report was finalized on 3/16/2023 11:33 AM by Dr. Geraldo Blakely M.D.       Ambulatory status:   -up ad melody    Social issues:   Social History     Socioeconomic History    Marital status: Single   Tobacco Use    Smoking status: Never   Substance and Sexual Activity    Alcohol use: No    Drug use: No    Sexual activity: Defer       NIH Stroke Scale:         Registered Nurse, RN  03/16/23 15:33 EDT

## 2023-03-16 NOTE — H&P
[facility]   HISTORY AND PHYSICAL    Patient Name: Tia Mcnulty  : 1950  MRN: [id number]  Primary Care Physician:  Juana Foley MD  Date of admission: 3/16/2023    Subjective   Subjective     Chief Complaint:   Chief Complaint   Patient presents with   • Syncope         HPI:    Tia Mcnulty is a very pleasant afebrile 72 y.o.  female with a past medical history of GERD and skin cancer in remission.    Patient presented to the emergency department today with complaint of syncopal episode.  She has been admitted to the ED observation unit for further testing and evaluation.    Patient states this morning when she got up she was not feeling quite herself but proceeded to go downstairs and feed her cat.  She remembers leaning over the kitchen counter to open his can of food and the next thing she remembers is waking up sitting on the floor.  She denies biting her tongue or any areas of pain.  She does state that she noticed that she had urinary incontinence.  She reports a syncopal episode in 2017 when she had the flu but denies any other episodes.    She denies any chest pain, shortness of breath, nausea vomiting or diarrhea.  No leg swelling or recent travel.    Review of Systems   All systems were reviewed and negative except for: syncope, bladder incontinence    Personal History     Past Medical History:   Diagnosis Date   • Cancer (CMS/HCC)    • GERD (gastroesophageal reflux disease)    • History of colon polyps    • History of skin cancer    • [place] (hard of hearing)     HEARING AID EACH EAR   • Hypercholesteremia        Past Surgical History:   Procedure Laterality Date   • COLONOSCOPY         • ENDOSCOPY N/A 2018    Small HH, gastritis   • MOHS SURGERY     • ROTATOR CUFF REPAIR     • WISDOM TOOTH EXTRACTION         Family History: family history includes Heart disease in her father; Stomach cancer in her paternal grandmother; Stroke in her mother. Otherwise pertinent FHx was  reviewed and not pertinent to current issue.    Social History:  reports that she has never smoked. She does not have any smokeless tobacco history on file. She reports that she does not drink alcohol and does not use drugs.    Home Medications:  Acetaminophen, B Complex Vitamins, Cholecalciferol, Daily Multivitamin, Vitamin C, alendronate, aspirin, ezetimibe, predniSONE, and traMADol    Allergies:  No Known Allergies    Objective   Objective     Vitals:   Temp:  [96.5 °F (35.8 °C)] 96.5 °F (35.8 °C)  Heart Rate:  [60-99] 67  Resp:  [16] 16  BP: (121-154)/(60-86) 133/69  Physical Exam    Constitutional: Awake, alert   Eyes: PERRLA, sclerae anicteric, no conjunctival injection   HENT: NCAT, mucous membranes moist   Neck: Supple, no thyromegaly, no lymphadenopathy, trachea midline   Respiratory: Clear to auscultation bilaterally, nonlabored respirations    Cardiovascular: RRR, no murmurs, rubs, or gallops, palpable pedal pulses bilaterally   Gastrointestinal: Positive bowel sounds, soft, nontender, nondistended   Musculoskeletal: No bilateral ankle edema, no clubbing or cyanosis to extremities   Psychiatric: Appropriate affect, cooperative   Neurologic: Oriented x 3, strength symmetric in all extremities, Cranial Nerves grossly intact to confrontation, speech clear   Skin: No rashes     Result Review    Result Review:  I have personally reviewed the results from the time of this admission to 3/16/2023 15:06 EDT and agree with these findings:  [x]  Laboratory list / accordion  []  Microbiology  [x]  Radiology  []  EKG/Telemetry   []  Cardiology/Vascular   []  Pathology  []  Old records  []  Other:  Most notable findings include: CT head negative for acute intracranial findings, chest x-ray shows no acute process, WBC 15.9, blood glucose 115      Assessment & Plan   Assessment / Plan     Brief Patient Summary:  Tia Mcnulty is a 72 y.o. female who is being evaluated for probable syncopal episode.    Active Hospital  Problems:  Active Hospital Problems    Diagnosis    • **Syncope      Plan:     Syncope  Consult to neurology  CT head negative for acute findings  Check orthostatic vital signs  Telemetry  Check urinalysis to rule out UTI      DVT prophylaxis:  Mechanical DVT prophylaxis orders are present.    CODE STATUS:    Level Of Support Discussed With: Patient  Code Status (Patient has no pulse and is not breathing): CPR (Attempt to Resuscitate)  Medical Interventions (Patient has pulse or is breathing): Full Support    Admission Status:  I believe this patient meets observation status.    Electronically signed by JOANN Hardin, 03/16/23, 3:06 PM EDT.     I have worn appropriate PPE during this patient encounter, sanitized my hands both with entering and exiting patient's room.    77 minutes has been spent by [facility] providers in the care of this patient while under observation status    I have discussed plan of care with patient including advance care plan and/or surrogate decision maker.  Patient advises that their brother Napoleon Mcnulty [phone] will be their primary surrogate decision maker

## 2023-03-16 NOTE — ED PROVIDER NOTES
" EMERGENCY DEPARTMENT ENCOUNTER    Room Number:  16/16  Date seen:  3/16/2023  PCP: Juana Foley MD      HPI:  Chief Complaint: \"I passed out\"  A complete HPI/ROS/PMH/PSH/SH/FH are unobtainable due to: None  Context: Tia Mcnulty is a 72 y.o. female who presents to the ED c/o concern for syncope.  She states that around 5 AM this morning she was feeding her cats when she felt unwell, leaned over the sink, and then ultimately woke up on the floor.  She states that she was profusely sweaty when she woke up and that she had urinated on herself.  She states that she now feels well.  No history of similar symptoms.  No headache, chest pain, shortness of breath, neck pain, vision change.  She drove herself to her hearing appointment and then talk to her PCP at the office nearby and they instructed her to come to the emergency department for evaluation.          PAST MEDICAL HISTORY  Active Ambulatory Problems     Diagnosis Date Noted   • Nausea 08/20/2018   • Weight loss 08/20/2018   • Brain vascular malformation 02/05/2019     Resolved Ambulatory Problems     Diagnosis Date Noted   • No Resolved Ambulatory Problems     Past Medical History:   Diagnosis Date   • Cancer (CMS/HCC)    • GERD (gastroesophageal reflux disease)    • History of colon polyps    • History of skin cancer    • [place] (hard of hearing)    • Hypercholesteremia          PAST SURGICAL HISTORY  Past Surgical History:   Procedure Laterality Date   • COLONOSCOPY      2013   • ENDOSCOPY N/A 8/28/2018    Small HH, gastritis   • MOHS SURGERY     • ROTATOR CUFF REPAIR     • WISDOM TOOTH EXTRACTION           FAMILY HISTORY  Family History   Problem Relation Age of Onset   • Stomach cancer Paternal Grandmother    • Stroke Mother    • Heart disease Father          SOCIAL HISTORY  Social History     Socioeconomic History   • Marital status: Single   Tobacco Use   • Smoking status: Never   Substance and Sexual Activity   • Alcohol use: No   • Drug use: No   • " Sexual activity: Defer         ALLERGIES  Patient has no known allergies.        REVIEW OF SYSTEMS  Review of Systems     All systems reviewed and negative except for those discussed in HPI.       PHYSICAL EXAM  ED Triage Vitals [03/16/23 1056]   Temp Heart Rate Resp BP SpO2   96.5 °F (35.8 °C) 99 16 -- 96 %      Temp src Heart Rate Source Patient Position BP Location FiO2 (%)   Tympanic Monitor -- -- --       Physical Exam      GENERAL: no acute distress  HENT: nares patent  EYES: no scleral icterus  CV: regular rhythm, normal rate  RESPIRATORY: normal effort, clear to auscultation bilaterally  ABDOMEN: soft, nontender  MUSCULOSKELETAL: no deformity  NEURO:   Recent and remote memory functions are normal. The patient is attentive with normal concentration. Language is fluent. Speech is clear. The speech is non-dysarthric. Fund of knowledge is normal.   Symmetric smile with no facial droop.  Eyes close shut strongly bilaterally.  Symmetric eyebrow raise bilaterally.  EOMI, PERRL 3 mm and reactive to light bilaterally  CN II-XII grossly normal otherwise.  5/5 strength to extremities.  No pronator drift.  Intact FNF.  No meningismus.  PSYCH:  calm, cooperative  SKIN: warm, dry    Vital signs and nursing notes reviewed.          LAB RESULTS  Recent Results (from the past 24 hour(s))   ECG 12 Lead Syncope    Collection Time: 03/16/23 11:19 AM   Result Value Ref Range    QT Interval 390 ms   Comprehensive Metabolic Panel    Collection Time: 03/16/23 11:22 AM    Specimen: Blood   Result Value Ref Range    Glucose 115 (H) 65 - 99 mg/dL    BUN 12 8 - 23 mg/dL    Creatinine 0.83 0.57 - 1.00 mg/dL    Sodium 137 136 - 145 mmol/L    Potassium 4.6 3.5 - 5.2 mmol/L    Chloride 99 98 - 107 mmol/L    CO2 25.1 22.0 - 29.0 mmol/L    Calcium 10.0 8.6 - 10.5 mg/dL    Total Protein 7.4 6.0 - 8.5 g/dL    Albumin 4.3 3.5 - 5.2 g/dL    ALT (SGPT) 16 1 - 33 U/L    AST (SGOT) 22 1 - 32 U/L    Alkaline Phosphatase 72 39 - 117 U/L    Total  Bilirubin 0.5 0.0 - 1.2 mg/dL    Globulin 3.1 gm/dL    A/G Ratio 1.4 g/dL    BUN/Creatinine Ratio 14.5 7.0 - 25.0    Anion Gap 12.9 5.0 - 15.0 mmol/L    eGFR 74.5 >60.0 mL/min/1.73   Single High Sensitivity Troponin T    Collection Time: 03/16/23 11:22 AM    Specimen: Blood   Result Value Ref Range    HS Troponin T 9 <10 ng/L   High Sensitivity Troponin T    Collection Time: 03/16/23 11:22 AM    Specimen: Blood   Result Value Ref Range    HS Troponin T 9 <10 ng/L   CBC Auto Differential    Collection Time: 03/16/23 11:22 AM    Specimen: Blood   Result Value Ref Range    WBC 15.97 (H) 3.40 - 10.80 10*3/mm3    RBC 4.09 3.77 - 5.28 10*6/mm3    Hemoglobin 13.8 12.0 - 15.9 g/dL    Hematocrit 40.6 34.0 - 46.6 %    MCV 99.3 (H) 79.0 - 97.0 fL    MCH 33.7 (H) 26.6 - 33.0 pg    MCHC 34.0 31.5 - 35.7 g/dL    RDW 12.0 (L) 12.3 - 15.4 %    RDW-SD 43.9 37.0 - 54.0 fl    MPV 9.5 6.0 - 12.0 fL    Platelets 316 140 - 450 10*3/mm3    Neutrophil % 88.7 (H) 42.7 - 76.0 %    Lymphocyte % 5.3 (L) 19.6 - 45.3 %    Monocyte % 5.3 5.0 - 12.0 %    Eosinophil % 0.1 (L) 0.3 - 6.2 %    Basophil % 0.3 0.0 - 1.5 %    Immature Grans % 0.3 0.0 - 0.5 %    Neutrophils, Absolute 14.17 (H) 1.70 - 7.00 10*3/mm3    Lymphocytes, Absolute 0.85 0.70 - 3.10 10*3/mm3    Monocytes, Absolute 0.84 0.10 - 0.90 10*3/mm3    Eosinophils, Absolute 0.01 0.00 - 0.40 10*3/mm3    Basophils, Absolute 0.05 0.00 - 0.20 10*3/mm3    Immature Grans, Absolute 0.05 0.00 - 0.05 10*3/mm3    nRBC 0.0 0.0 - 0.2 /100 WBC   BNP    Collection Time: 03/16/23 11:22 AM    Specimen: Blood   Result Value Ref Range    proBNP 247.0 0.0 - 900.0 pg/mL       Ordered the above labs and reviewed the results.        RADIOLOGY  XR Chest 1 View    Result Date: 3/16/2023  XR CHEST 1 VW-  HISTORY: Female who is 72 years-old,  syncope  TECHNIQUE: Frontal view of the chest  COMPARISON: 9/30/2021  FINDINGS: Heart, mediastinum and pulmonary vasculature are unremarkable. No focal pulmonary consolidation,  pleural effusion, or pneumothorax. Old granulomatous disease is apparent. No acute osseous process.      No evidence for acute pulmonary process. Follow-up as clinical indications persist.  This report was finalized on 3/16/2023 11:33 AM by Dr. Geraldo Blakely M.D.        Ordered the above noted radiological studies. Reviewed by me in PACS.          PROCEDURES  Procedures          MEDICATIONS GIVEN IN ER  Medications   sodium chloride 0.9 % flush 10 mL (has no administration in time range)         MEDICAL DECISION MAKING, PROGRESS, and CONSULTS    Discussion below represents my analysis of pertinent findings related to patient's condition, differential diagnosis, treatment plan and final disposition.      Orders placed during this visit:  Orders Placed This Encounter   Procedures   • XR Chest 1 View   • CT Head Without Contrast   • Comprehensive Metabolic Panel   • Single High Sensitivity Troponin T   • High Sensitivity Troponin T   • CBC Auto Differential   • BNP   • High Sensitivity Troponin T 2Hr   • Monitor Blood Pressure   • Cardiac Monitoring   • Pulse Oximetry, Continuous   • ECG 12 Lead Syncope   • Insert Peripheral IV   • CBC & Differential           Differential diagnosis:    Syncope, seizure, cardiac arrhythmia, ACS, vagal syncope, orthostatic hypotension        Independent interpretation of labs, radiology studies, and discussions with consultants:  ED Course as of 03/17/23 1511   Thu Mar 16, 2023   1109 On medical chart review, patient saw Dr. Garcia, neurosurgery on 2/5/2019.  I reviewed the progress note.  She has brain vascular malformation.  Dr. Garcia evaluated the patient's MRI.  No evidence of tumor.  This felt to be blood vessels causing what appears to be a tumor. [TD]   1128 EKG independently interpreted by myself.  Time 11:19 AM.  Sinus rhythm.  Heart rate 73.  Normal intervals and axis.  No acute ST abnormality.  Left atrial abnormality. [TD]   1300 HS Troponin T: 9 [TD]   1301 CT head is  acutely negative. [TD]      ED Course User Index  [TD] Jefe Parekh II, MD         Due to the patient's syncopal event, I believe that she would benefit from admission for further restratification.  There are some question about the possibility of seizure versus syncope.        PPE: The patient wore a mask throughout the entire encounter. I wore a well-fitting mask.    DIAGNOSIS  Final diagnoses:   Syncope, unspecified syncope type         DISPOSITION  Admit      Latest Documented Vital Signs:  As of 11:55 EDT  BP-   HR- 99 Temp- 96.5 °F (35.8 °C) (Tympanic) O2 sat- 96%      --    Please note that portions of this were completed with a voice recognition program.       Note Disclaimer: At [facility], we believe that sharing information builds trust and better relationships. You are receiving this note because you are receiving care at [facility] or recently visited. It is possible you will see health information before a provider has talked with you about it. This kind of information can be easy to misunderstand. To help you fully understand what it means for your health, we urge you to discuss this note with your provider.       Jefe Parekh II, MD  03/17/23 0954

## 2023-03-17 ENCOUNTER — APPOINTMENT (OUTPATIENT)
Dept: MRI IMAGING | Facility: HOSPITAL | Age: 73
End: 2023-03-17
Payer: MEDICARE

## 2023-03-17 ENCOUNTER — APPOINTMENT (OUTPATIENT)
Dept: NEUROLOGY | Facility: HOSPITAL | Age: 73
End: 2023-03-17
Payer: MEDICARE

## 2023-03-17 VITALS
BODY MASS INDEX: 22.25 KG/M2 | DIASTOLIC BLOOD PRESSURE: 76 MMHG | SYSTOLIC BLOOD PRESSURE: 130 MMHG | WEIGHT: 125.6 LBS | HEART RATE: 71 BPM | HEIGHT: 63 IN | TEMPERATURE: 97.7 F | OXYGEN SATURATION: 96 % | RESPIRATION RATE: 18 BRPM

## 2023-03-17 LAB
ALBUMIN SERPL-MCNC: 3.8 G/DL (ref 3.5–5.2)
ALBUMIN/GLOB SERPL: 1.6 G/DL
ALP SERPL-CCNC: 61 U/L (ref 39–117)
ALT SERPL W P-5'-P-CCNC: 14 U/L (ref 1–33)
ANION GAP SERPL CALCULATED.3IONS-SCNC: 9.6 MMOL/L (ref 5–15)
AST SERPL-CCNC: 20 U/L (ref 1–32)
BASOPHILS # BLD AUTO: 0.06 10*3/MM3 (ref 0–0.2)
BASOPHILS NFR BLD AUTO: 0.8 % (ref 0–1.5)
BILIRUB SERPL-MCNC: 0.5 MG/DL (ref 0–1.2)
BUN SERPL-MCNC: 11 MG/DL (ref 8–23)
BUN/CREAT SERPL: 16.7 (ref 7–25)
CALCIUM SPEC-SCNC: 9 MG/DL (ref 8.6–10.5)
CHLORIDE SERPL-SCNC: 107 MMOL/L (ref 98–107)
CK SERPL-CCNC: 68 U/L (ref 20–180)
CO2 SERPL-SCNC: 23.4 MMOL/L (ref 22–29)
CREAT SERPL-MCNC: 0.66 MG/DL (ref 0.57–1)
DEPRECATED RDW RBC AUTO: 43.5 FL (ref 37–54)
EGFRCR SERPLBLD CKD-EPI 2021: 92.8 ML/MIN/1.73
EOSINOPHIL # BLD AUTO: 0.21 10*3/MM3 (ref 0–0.4)
EOSINOPHIL NFR BLD AUTO: 2.8 % (ref 0.3–6.2)
ERYTHROCYTE [DISTWIDTH] IN BLOOD BY AUTOMATED COUNT: 11.9 % (ref 12.3–15.4)
ERYTHROCYTE [SEDIMENTATION RATE] IN BLOOD: 22 MM/HR (ref 0–30)
FOLATE SERPL-MCNC: >20 NG/ML (ref 4.78–24.2)
GLOBULIN UR ELPH-MCNC: 2.4 GM/DL
GLUCOSE SERPL-MCNC: 98 MG/DL (ref 65–99)
HBA1C MFR BLD: 5.3 % (ref 4.8–5.6)
HCT VFR BLD AUTO: 34.7 % (ref 34–46.6)
HGB BLD-MCNC: 11.9 G/DL (ref 12–15.9)
IMM GRANULOCYTES # BLD AUTO: 0.03 10*3/MM3 (ref 0–0.05)
IMM GRANULOCYTES NFR BLD AUTO: 0.4 % (ref 0–0.5)
LYMPHOCYTES # BLD AUTO: 1.69 10*3/MM3 (ref 0.7–3.1)
LYMPHOCYTES NFR BLD AUTO: 22.5 % (ref 19.6–45.3)
MCH RBC QN AUTO: 33.7 PG (ref 26.6–33)
MCHC RBC AUTO-ENTMCNC: 34.3 G/DL (ref 31.5–35.7)
MCV RBC AUTO: 98.3 FL (ref 79–97)
MONOCYTES # BLD AUTO: 0.78 10*3/MM3 (ref 0.1–0.9)
MONOCYTES NFR BLD AUTO: 10.4 % (ref 5–12)
NEUTROPHILS NFR BLD AUTO: 4.73 10*3/MM3 (ref 1.7–7)
NEUTROPHILS NFR BLD AUTO: 63.1 % (ref 42.7–76)
NRBC BLD AUTO-RTO: 0 /100 WBC (ref 0–0.2)
PLATELET # BLD AUTO: 273 10*3/MM3 (ref 140–450)
PMV BLD AUTO: 10.1 FL (ref 6–12)
POTASSIUM SERPL-SCNC: 4.1 MMOL/L (ref 3.5–5.2)
PROT SERPL-MCNC: 6.2 G/DL (ref 6–8.5)
RBC # BLD AUTO: 3.53 10*6/MM3 (ref 3.77–5.28)
SODIUM SERPL-SCNC: 140 MMOL/L (ref 136–145)
TSH SERPL DL<=0.05 MIU/L-ACNC: 1.83 UIU/ML (ref 0.27–4.2)
VIT B12 BLD-MCNC: 421 PG/ML (ref 211–946)
WBC NRBC COR # BLD: 7.5 10*3/MM3 (ref 3.4–10.8)

## 2023-03-17 PROCEDURE — 95816 EEG AWAKE AND DROWSY: CPT | Performed by: STUDENT IN AN ORGANIZED HEALTH CARE EDUCATION/TRAINING PROGRAM

## 2023-03-17 PROCEDURE — 85652 RBC SED RATE AUTOMATED: CPT | Performed by: PSYCHIATRY & NEUROLOGY

## 2023-03-17 PROCEDURE — 95816 EEG AWAKE AND DROWSY: CPT

## 2023-03-17 PROCEDURE — 84443 ASSAY THYROID STIM HORMONE: CPT | Performed by: PSYCHIATRY & NEUROLOGY

## 2023-03-17 PROCEDURE — 70551 MRI BRAIN STEM W/O DYE: CPT

## 2023-03-17 PROCEDURE — 85025 COMPLETE CBC W/AUTO DIFF WBC: CPT | Performed by: NURSE PRACTITIONER

## 2023-03-17 PROCEDURE — 99232 SBSQ HOSP IP/OBS MODERATE 35: CPT | Performed by: INTERNAL MEDICINE

## 2023-03-17 PROCEDURE — G0378 HOSPITAL OBSERVATION PER HR: HCPCS

## 2023-03-17 PROCEDURE — 80053 COMPREHEN METABOLIC PANEL: CPT | Performed by: NURSE PRACTITIONER

## 2023-03-17 PROCEDURE — 82746 ASSAY OF FOLIC ACID SERUM: CPT | Performed by: PSYCHIATRY & NEUROLOGY

## 2023-03-17 PROCEDURE — 99221 1ST HOSP IP/OBS SF/LOW 40: CPT | Performed by: PSYCHIATRY & NEUROLOGY

## 2023-03-17 PROCEDURE — 83036 HEMOGLOBIN GLYCOSYLATED A1C: CPT | Performed by: PSYCHIATRY & NEUROLOGY

## 2023-03-17 PROCEDURE — 82607 VITAMIN B-12: CPT | Performed by: PSYCHIATRY & NEUROLOGY

## 2023-03-17 PROCEDURE — 82550 ASSAY OF CK (CPK): CPT | Performed by: PSYCHIATRY & NEUROLOGY

## 2023-03-17 RX ADMIN — Medication 10 ML: at 08:25

## 2023-03-17 RX ADMIN — ACETAMINOPHEN 325 MG: 325 TABLET, FILM COATED ORAL at 04:20

## 2023-03-17 RX ADMIN — OXYCODONE HYDROCHLORIDE AND ACETAMINOPHEN 500 MG: 500 TABLET ORAL at 08:29

## 2023-03-17 NOTE — PROGRESS NOTES
ED OBSERVATION PROGRESS/DISCHARGE SUMMARY    Date of Admission: 3/16/2023   LOS: 0 days   PCP: Juana Foley MD      Hospital Outcome:   72-year-old female admitted to the observation unit for further evaluation after syncopal episode with urinary incontinence.  In the ER, patient's EKG showed normal sinus rhythm with PACs.  Delta troponin was negative.  And a head CT showed no acute findings.  Patient was noted with leukocytosis of 16 with a normal chest x-ray and urinalysis without evidence of infection.  Patient was noted orthostatic with blood pressure dropping from 137/73 to 109/72 upon standing.  Cardiology recommends outpatient echocardiogram. Orthostatic vitals signs negative this AM, patient has had good PO intake.     She underwent EEG that did not show any epileptogenic activity.  Neurology recommends outpatient EEG and MRI brain with contrast. Patient is agreeable to plan.     ROS:  General: no fevers, chills  Respiratory: no cough, dyspnea  Cardiovascular: no chest pain, palpitations  Abdomen: No abdominal pain, nausea, vomiting, or diarrhea  Neurologic: No focal weakness    Objective   Physical Exam:  I have reviewed the vital signs.  Temp:  [96.5 °F (35.8 °C)-98.4 °F (36.9 °C)] 98.2 °F (36.8 °C)  Heart Rate:  [60-99] 64  Resp:  [16] 16  BP: (109-154)/(60-86) 117/68  General Appearance:    Alert, cooperative, no distress  Head:    Normocephalic, atraumatic  Eyes:    Sclerae anicteric  Neck:   Supple, no mass  Lungs: Clear to auscultation bilaterally, respirations unlabored  Heart: Regular rate and rhythm, S1 and S2 normal, no murmur, rub or gallop  Abdomen:  Soft, non-tender, bowel sounds active, nondistended  Extremities: No clubbing, cyanosis, or edema to lower extremities  Pulses:  2+ and symmetric in distal lower extremities  Skin: No rashes   Neurologic: Oriented x3, Normal strength to extremities    Results Review:    I have reviewed the labs, radiology results and diagnostic  studies.    Results from last 7 days   Lab Units 03/16/23  1122   WBC 10*3/mm3 15.97*   HEMOGLOBIN g/dL 13.8   HEMATOCRIT % 40.6   PLATELETS 10*3/mm3 316     Results from last 7 days   Lab Units 03/16/23  1122   SODIUM mmol/L 137   POTASSIUM mmol/L 4.6   CHLORIDE mmol/L 99   CO2 mmol/L 25.1   BUN mg/dL 12   CREATININE mg/dL 0.83   CALCIUM mg/dL 10.0   BILIRUBIN mg/dL 0.5   ALK PHOS U/L 72   ALT (SGPT) U/L 16   AST (SGOT) U/L 22   GLUCOSE mg/dL 115*     Imaging Results (Last 24 Hours)     Procedure Component Value Units Date/Time    CT Head Without Contrast [[id number]] Collected: 03/16/23 1203     Updated: 03/16/23 1408    Narrative:      CT HEAD WITHOUT CONTRAST     HISTORY: Syncope versus seizure.     COMPARISON: MRI brain 08/05/2019.     FINDINGS: The brain ventricles are symmetrical. There is no evidence of  hemorrhage, hydrocephalus or of a focal area of decreased attenuation to  suggest acute infarction. Mild vascular calcification is noted.       Impression:      No acute process is identified. Further evaluation could be  performed with an MRI examination of the brain, particularly if the  patient has a history of new onset seizure activity.        Radiation dose reduction techniques were utilized, including automated  exposure control and exposure modulation based on body size.     This report was finalized on 3/16/2023 2:05 PM by Dr. Lb Jerome M.D.       XR Chest 1 View [[id number]] Collected: 03/16/23 1132     Updated: 03/16/23 1136    Narrative:      XR CHEST 1 VW-     HISTORY: Female who is 72 years-old,  syncope     TECHNIQUE: Frontal view of the chest     COMPARISON: 9/30/2021     FINDINGS: Heart, mediastinum and pulmonary vasculature are unremarkable.  No focal pulmonary consolidation, pleural effusion, or pneumothorax. Old  granulomatous disease is apparent. No acute osseous process.       Impression:      No evidence for acute pulmonary process. Follow-up as  clinical indications persist.      This report was finalized on 3/16/2023 11:33 AM by Dr. Geraldo Blakely M.D.             I have reviewed the medications.  ---------------------------------------------------------------------------------------------  Assessment & Plan   Assessment/Problem List    Syncope      Plan:    Syncope with urinary incontinence  -CT head negative for acute findings  -Troponin 9, 10.  -EKG normal sinus rhythm with PACs  -Orthostatics note blood pressure dropped from 137/73 to 109/72 upon standing resolved this a.m.  -Neurology and cardiology consulted  -EEG normal  -Hemoglobin A1c, TSH, folate, CK level and B12 levels normal     Leukocytosis  -Resolved with a.m. labs    Disposition: Home    Follow-up after Discharge: [facility]    This note will serve as a discharge summary      40 minutes have been spent by [facility] providers in the care of this patient while under observation status.      I wore an face mask, eye protection, and gloves during this patient encounter. Patient also wearing a surgical mask. Hand hygeine performed before and after seeing the patient.      Yadira Heller PA-C 03/17/23 04:49 EDT

## 2023-03-17 NOTE — PLAN OF CARE
Problem: Adult Inpatient Plan of Care  Goal: Plan of Care Review  Outcome: Met  Goal: Patient-Specific Goal (Individualized)  Outcome: Met  Goal: Absence of Hospital-Acquired Illness or Injury  Outcome: Met  Intervention: Identify and Manage Fall Risk  Recent Flowsheet Documentation  Taken 3/17/2023 1415 by Elaine Mcgovern RN  Safety Promotion/Fall Prevention:   activity supervised   nonskid shoes/slippers when out of bed   room organization consistent   safety round/check completed  Taken 3/17/2023 1222 by Elaine Mcgovern RN  Safety Promotion/Fall Prevention:   activity supervised   nonskid shoes/slippers when out of bed   safety round/check completed   clutter free environment maintained  Taken 3/17/2023 1000 by Elaine Mcgovern RN  Safety Promotion/Fall Prevention: activity supervised  Taken 3/17/2023 0800 by Elaine Mcgovern RN  Safety Promotion/Fall Prevention:   activity supervised   nonskid shoes/slippers when out of bed   room organization consistent   safety round/check completed   clutter free environment maintained  Intervention: Prevent Skin Injury  Recent Flowsheet Documentation  Taken 3/17/2023 1415 by Elaine Mcgovern RN  Body Position: position changed independently  Taken 3/17/2023 1222 by Elaine Mcgovern RN  Body Position: position changed independently  Taken 3/17/2023 1000 by Elaine Mcgovern RN  Body Position: position changed independently  Taken 3/17/2023 0800 by Elaine Mcgovern RN  Body Position: position changed independently  Skin Protection: adhesive use limited  Intervention: Prevent and Manage VTE (Venous Thromboembolism) Risk  Recent Flowsheet Documentation  Taken 3/17/2023 1415 by Elaine Mcgovern RN  Activity Management: activity adjusted per tolerance  Taken 3/17/2023 1222 by Elaine Mcgovern RN  Activity Management:   ambulated to bathroom   activity adjusted per tolerance  Taken 3/17/2023 1000 by Elaine Mcgovern RN  Activity Management: activity adjusted per tolerance  Intervention: Prevent  Infection  Recent Flowsheet Documentation  Taken 3/17/2023 1415 by Elaine Mcgovern RN  Infection Prevention:   single patient room provided   rest/sleep promoted  Taken 3/17/2023 1222 by Elaine Mcgovern RN  Infection Prevention:   rest/sleep promoted   single patient room provided  Taken 3/17/2023 1000 by Elaine Mcgovern RN  Infection Prevention:   rest/sleep promoted   single patient room provided  Taken 3/17/2023 0800 by Elaine cMgovern RN  Infection Prevention:   single patient room provided   rest/sleep promoted  Goal: Optimal Comfort and Wellbeing  Outcome: Met  Intervention: Provide Person-Centered Care  Recent Flowsheet Documentation  Taken 3/17/2023 0800 by Elaine Mcgovern RN  Trust Relationship/Rapport:   thoughts/feelings acknowledged   reassurance provided   empathic listening provided   questions answered   care explained  Goal: Readiness for Transition of Care  Outcome: Met     Problem: Fall Injury Risk  Goal: Absence of Fall and Fall-Related Injury  Outcome: Met  Intervention: Promote Injury-Free Environment  Recent Flowsheet Documentation  Taken 3/17/2023 1415 by Elaine Mcgovern RN  Safety Promotion/Fall Prevention:   activity supervised   nonskid shoes/slippers when out of bed   room organization consistent   safety round/check completed  Taken 3/17/2023 1222 by Elaine Mcgovern RN  Safety Promotion/Fall Prevention:   activity supervised   nonskid shoes/slippers when out of bed   safety round/check completed   clutter free environment maintained  Taken 3/17/2023 1000 by Elaine Mcgovern RN  Safety Promotion/Fall Prevention: activity supervised  Taken 3/17/2023 0800 by Elaine Mcgovern RN  Safety Promotion/Fall Prevention:   activity supervised   nonskid shoes/slippers when out of bed   room organization consistent   safety round/check completed   clutter free environment maintained     Problem: Pain Chronic (Persistent) (Comorbidity Management)  Goal: Acceptable Pain Control and Functional Ability  Outcome:  Met  Intervention: Optimize Psychosocial Wellbeing  Recent Flowsheet Documentation  Taken 3/17/2023 0800 by Elaine Mcgovern, RN  Supportive Measures: active listening utilized   Goal Outcome Evaluation:

## 2023-03-17 NOTE — CONSULTS
Date of Hospital Visit: 3/16/2023  Date of consult: 3/17/23  Encounter Provider: Rod Jenkins MD  Place of Service: [facility] CARDIOLOGY  Patient Name: Tia Mcnulty  :1950  Referral Provider: Deshaun Newberry MD    Chief complaint:  syncope  Reason for consult: Syncope   History of Present Illness   Tia Mcnulty is a 72 year old pt with a history of GERD and skin cancer.   Pt presented to ER on 3/16/23 with complaints of a syncopal episode. She reported she got up this am and was not feeling quite herself and then going downstairs to feed her cat. She remembers leaning over the kitchen counter to open the cat food and then the next thing she remembers is waking up sitting on the floor. She did have urinary incontinence but denied biting her tongue of any areas of pain. Pt reported she had a syncopal episode in  when she had the flu. In ER, EKG showed NSR with PACs. Delta trop negative, CT of head negative for acute, CXR negative. UA negative, Orthostatics dropping form 137/73 down to 109/72 when standing. WBC 15.97.       Stress test 19    • Myocardial perfusion imaging indicates a normal myocardial perfusion study with no evidence of ischemia.  • Left ventricular ejection fraction is hyperdynamic (Calculated EF > 70%).  • Impressions are consistent with a low risk study.    Past Medical History:   Diagnosis Date   • Cancer (HCC)    • GERD (gastroesophageal reflux disease)    • History of colon polyps    • History of skin cancer    • [place] (hard of hearing)     HEARING AID EACH EAR   • Hypercholesteremia        Past Surgical History:   Procedure Laterality Date   • COLONOSCOPY         • ENDOSCOPY N/A 2018    Small HH, gastritis   • MOHS SURGERY     • ROTATOR CUFF REPAIR     • WISDOM TOOTH EXTRACTION         Medications Prior to Admission   Medication Sig Dispense Refill Last Dose   • Acetaminophen (Acetaminophen Extra Strength) 500 MG capsule Take 2  capsules by mouth Every 6 (Six) Hours As Needed for Mild Pain.   Past Week   • Ascorbic Acid (Vitamin C) 500 MG capsule Take 1,000 mg by mouth Daily.   3/16/2023 at 0800   • B Complex Vitamins (VITAMIN B COMPLEX PO) Take 1 tablet by mouth Daily.   3/16/2023 at 0800   • Cholecalciferol (VITAMIN D-3 PO) Take 1 tablet by mouth Daily.   3/16/2023 at 0800   • ezetimibe (ZETIA) 10 MG tablet Take 1 tablet by mouth Daily.   3/16/2023 at 0800   • alendronate (FOSAMAX) 70 MG tablet Take 1 tablet by mouth 1 (One) Time Per Week. (Patient not taking: Reported on 3/16/2023)  3 Not Taking   • aspirin 325 MG tablet Take 325 mg by mouth Daily. (Patient not taking: Reported on 3/16/2023)   Not Taking   • Multiple Vitamins-Minerals (Daily Multivitamin) capsule Take 1 tablet by mouth Daily. (Patient not taking: Reported on 3/16/2023)   Not Taking   • predniSONE (DELTASONE) 20 MG tablet PLEASE SEE ATTACHED FOR DETAILED DIRECTIONS (Patient not taking: Reported on 3/16/2023)  0 Not Taking   • predniSONE (DELTASONE) 5 MG tablet PLEASE SEE ATTACHED FOR DETAILED DIRECTIONS (Patient not taking: Reported on 3/16/2023)  0 Not Taking   • traMADol (ULTRAM) 50 MG tablet Take 50 mg by mouth Every 4 (Four) Hours As Needed. Take 1-2 tablets every 4-6 hours as needed for pain. (Patient not taking: Reported on 3/16/2023)   Not Taking       Current Meds  Scheduled Meds:vitamin C, 500 mg, Oral, Daily  sodium chloride, 10 mL, Intravenous, Q12H      Continuous Infusions:   PRN Meds:.•  acetaminophen  •  nitroglycerin  •  [COMPLETED] Insert Peripheral IV **AND** sodium chloride  •  sodium chloride  •  sodium chloride    Allergies as of 03/16/2023   • (No Known Allergies)       Social History     Socioeconomic History   • Marital status: Single   Tobacco Use   • Smoking status: Never   Vaping Use   • Vaping Use: Never used   Substance and Sexual Activity   • Alcohol use: No   • Drug use: No   • Sexual activity: Defer       Family History   Problem Relation  "Age of Onset   • Stomach cancer Paternal Grandmother    • Stroke Mother    • Heart disease Father        REVIEW OF SYSTEMS:   All systems reviewed and pertinent positives include in HPI otherwise negative review of systems.       Objective:   Temp:  [96.5 °F (35.8 °C)-98.4 °F (36.9 °C)] 98.2 °F (36.8 °C)  Heart Rate:  [60-99] 73  Resp:  [16-17] 17  BP: (109-154)/(60-86) 131/72  Body mass index is 22.25 kg/m².  Flowsheet Rows    Flowsheet Row First Filed Value   Admission Height 160 cm (63\") Documented at 03/16/2023 1056   Admission Weight 57.6 kg (127 lb) Documented at 03/16/2023 1056        Vitals:    03/17/23 0700   BP: 131/72   Pulse: 73   Resp: 17   Temp: 98.2 °F (36.8 °C)   SpO2: 97%       General Appearance:    Alert, cooperative, in no acute distress   Head:    Normocephalic, without obvious abnormality, atraumatic   Eyes:            Lids and lashes normal, conjunctivae and sclerae normal, no   icterus, no pallor, corneas clear, PERRLA   Ears:    Ears appear intact with no abnormalities noted   Throat:   No oral lesions, no thrush, oral mucosa moist   Neck:   No adenopathy, supple, trachea midline, no thyromegaly, no   carotid bruit, no JVD   Back:     No kyphosis present, no scoliosis present, no skin lesions, erythema or scars, no tenderness to percussion or palpation, range of motion normal   Lungs:     Clear to auscultation,respirations regular, even and unlabored    Heart:    Regular rhythm and normal rate, normal S1 and S2, no murmur, no gallop, no rub, no click   Chest Wall:    No abnormalities observed   Abdomen:     Normal bowel sounds, no masses, no organomegaly, soft nontender, nondistended, no guarding, no rebound  tenderness   Extremities:   Moves all extremities well, no edema, no cyanosis, no redness   Pulses:   Pulses palpable and equal bilaterally. Normal radial, carotid, femoral, dorsalis pedis and posterior tibial pulses bilaterally. Normal abdominal aorta   Skin:  Neurology:   Psychiatric: "   No bleeding, bruising or rash   Normal speech and cranial nerve exam, no focal deficit   Alert and oriented x 3, normal mood and affect                 Review of Data:      Results from last 7 days   Lab Units 03/17/23  0414   SODIUM mmol/L 140   POTASSIUM mmol/L 4.1   CHLORIDE mmol/L 107   CO2 mmol/L 23.4   BUN mg/dL 11   CREATININE mg/dL 0.66   CALCIUM mg/dL 9.0   BILIRUBIN mg/dL 0.5   ALK PHOS U/L 61   ALT (SGPT) U/L 14   AST (SGOT) U/L 20   GLUCOSE mg/dL 98     Results from last 7 days   Lab Units 03/16/23  1655 03/16/23  1122   HSTROP T ng/L 10* 9  9     @LABRCNTbnp@  Results from last 7 days   Lab Units 03/17/23  0414 03/16/23  1122   WBC 10*3/mm3 7.50 15.97*   HEMOGLOBIN g/dL 11.9* 13.8   HEMATOCRIT % 34.7 40.6   PLATELETS 10*3/mm3 273 316             @LABRCNTIP(chol,trig,hdl,ldl)            I personally viewed and interpreted the patient's EKG/Telemetry data  )  Patient Active Problem List   Diagnosis   • Nausea   • Weight loss   • Brain vascular malformation   • Syncope         Assessment and Plan:      1. Syncope-probably from orthostatic hypotension.  She was orally hydrated overnight.  Normal orthostatic blood pressure during exam.   She has mild aortic valve area murmur likely due to aortic sclerosis  I have ordered outpatient echocardiogram.  She had a normal myocardial perfusion study in 2019 so no clinical indication to repeat stress test  Continue statin.   She will keep yourself adequately hydrated.    I have discussed plans with her NP Allyssa.     [place] to [place] patient home from cardiology standpoint.    Rod Jenkins MD  03/17/23  08:54 EDT.  Time spent in reviewing chart, discussion and examination:

## 2023-03-17 NOTE — DISCHARGE INSTRUCTIONS
Follow-up with neurology for outpatient EEG and MRI brain with contrast, follow-up with cardiology or PCP for an outpatient echocardiogram

## 2023-03-17 NOTE — PLAN OF CARE
Goal Outcome Evaluation:  Plan of Care Reviewed With: patient        Progress: no change  Outcome Evaluation: A/O, medicated for HA, CT head completed, neurology and cardiology consult pending, up with SBA, VSS, will continue to monitor.

## 2023-03-17 NOTE — CONSULTS
"Neurology Consult Note    Referring Provider: Dr. Newberry  Reason for Consultation: Syncope      History of present illness:      72-year-old woman with past medical history of hearing loss, hypercholesterolemia, skin cancer.  Status post right knee replacement surgery.    She presents after an episode of loss of consciousness at home.  She lives alone and is completely independent.  On the day of admission she was at the sink preparing a meal for her cat when she felt unwell, which she describes as a heavy feeling in her head and shoulders.  She leaned over the sink and the next thing she knew she found herself on the floor with her back to the sink and the cat food was spilled all over the floor.  She was profusely sweaty and had urinated on herself.  She denies head injury or other significant injury associated with fall.  No tongue bite.  She reports a previous episode of syncope in 2017 which occurred when she was sitting on the toilet after a very hot bath/shower with associated diaphoresis, and was told that \"her blood pressure went low\".  Head CT done at that time showed minimal small vessel disease.  Brain MRI was performed in 1/2019 as part of a work-up for bilateral hearing loss which showed a possible area of suspicious enhancement overlying the right parietal lobe which was not seen on a repeat scan from 5/2019.  She was seen by neurosurgery (Dr. Alexis) because of concern that this finding might be a vascular malformation, who recommended no further work-up.  Note is made of a tiny left corona radiata chronic infarct was seen which was unchanged from a previous MRI from 2011.  No other history of neurological symptoms.  There is a family history of stroke in her mother.  In the ED found to have no focal deficits, WBC elevated at 15.97, unremarkable UA, CMP.  Head CT without acute intracranial process.      Past Medical History:   Diagnosis Date   • Cancer (HCC)    • GERD (gastroesophageal reflux disease) "    • History of colon polyps    • History of skin cancer    • [place] (hard of hearing)     HEARING AID EACH EAR   • Hypercholesteremia        No Known Allergies  No current facility-administered medications on file prior to encounter.     Current Outpatient Medications on File Prior to Encounter   Medication Sig   • Acetaminophen (Acetaminophen Extra Strength) 500 MG capsule Take 2 capsules by mouth Every 6 (Six) Hours As Needed for Mild Pain.   • Ascorbic Acid (Vitamin C) 500 MG capsule Take 1,000 mg by mouth Daily.   • B Complex Vitamins (VITAMIN B COMPLEX PO) Take 1 tablet by mouth Daily.   • Cholecalciferol (VITAMIN D-3 PO) Take 1 tablet by mouth Daily.   • ezetimibe (ZETIA) 10 MG tablet Take 1 tablet by mouth Daily.   • alendronate (FOSAMAX) 70 MG tablet Take 1 tablet by mouth 1 (One) Time Per Week. (Patient not taking: Reported on 3/16/2023)   • aspirin 325 MG tablet Take 325 mg by mouth Daily. (Patient not taking: Reported on 3/16/2023)   • Multiple Vitamins-Minerals (Daily Multivitamin) capsule Take 1 tablet by mouth Daily. (Patient not taking: Reported on 3/16/2023)   • predniSONE (DELTASONE) 20 MG tablet PLEASE SEE ATTACHED FOR DETAILED DIRECTIONS (Patient not taking: Reported on 3/16/2023)   • predniSONE (DELTASONE) 5 MG tablet PLEASE SEE ATTACHED FOR DETAILED DIRECTIONS (Patient not taking: Reported on 3/16/2023)   • traMADol (ULTRAM) 50 MG tablet Take 50 mg by mouth Every 4 (Four) Hours As Needed. Take 1-2 tablets every 4-6 hours as needed for pain. (Patient not taking: Reported on 3/16/2023)       Social History     Socioeconomic History   • Marital status: Single   Tobacco Use   • Smoking status: Never   Vaping Use   • Vaping Use: Never used   Substance and Sexual Activity   • Alcohol use: No   • Drug use: No   • Sexual activity: Defer     Family History   Problem Relation Age of Onset   • Stomach cancer Paternal Grandmother    • Stroke Mother    • Heart disease Father        Review of Systems  A  14-point review of systems was reviewed and was negative except for all pertinent positives are noted in the HPI    Vital Signs   Temp:  [96.5 °F (35.8 °C)-98.4 °F (36.9 °C)] 98.2 °F (36.8 °C)  Heart Rate:  [60-99] 73  Resp:  [16-17] 17  BP: (109-154)/(60-86) 131/72    General Exam:  Head:  Normocephalic, atraumatic  HEENT:  Neck supple  Lungs:  Clear to auscultation  Abdomen:  Nontender, nondistended  Extremities:  No signs of peripheral edema  Skin:  No rashes    Neurologic Exam:    Mental Status:    -Awake, alert, oriented x3  -No word finding difficulties  -No aphasia  -No dysarthria  -Follows simple and complex commands    CN II:  Visual fields full.  Pupils equally reactive to light  CN III, IV, VI:  Extraocular muscles full with no signs of nystagmus  CN V:  Facial sensory is symmetric with no asymmetries.  CN VII:  Facial motor symmetric  CN VIII:  Gross hearing intact bilaterally  CN IX:  Palate elevates symmetrically  CN X:  Palate elevates symmetrically  CN XI:  Shoulder shrug symmetric  CN XII:  Tongue is midline on protrusion    Motor: (strength out of 5:  1= minimal movement, 2 = movement in plane of gravity, 3 = movement against gravity, 4 = movement against some resistance, 5 = full strength)    -Right Upper Ext: Proximal: 5 Distal: 5  -Left Upper Ext: Proximal: 5 Distal: 5    -Right Lower Ext: Proximal: 5 Distal: 5  -Left Lower Ext: Proximal: 5 Distal: 5    DTR:  2+ throughout, plantar reflexes extensor bilaterally    Sensory:  -Intact to light touch, pinprick    Coordination:  -Finger-to-nose intact  -Heel-to-shin intact  -No ataxia    Gait  -No signs of ataxia  -ambulates unassisted      Results Review:  Lab Results (last 24 hours)     Procedure Component Value Units Date/Time    Hemoglobin A1c [[id number]]  (Normal) Collected: 03/17/23 0823    Specimen: Blood from Arm, Left Updated: 03/17/23 0848     Hemoglobin A1C 5.30 %     Narrative:      Hemoglobin A1C Ranges:    Increased Risk for Diabetes   5.7% to 6.4%  Diabetes                     >= 6.5%  Diabetic Goal                < 7.0%    Sedimentation Rate [[id number]] Collected: 03/17/23 0823    Specimen: Blood from Arm, Left Updated: 03/17/23 0831    Vitamin B12 [[id number]] Collected: 03/17/23 0823    Specimen: Blood from Arm, Left Updated: 03/17/23 0831    Folate [[id number]] Collected: 03/17/23 0823    Specimen: Blood from Arm, Left Updated: 03/17/23 0831    TSH [[id number]] Collected: 03/17/23 0823    Specimen: Blood from Arm, Left Updated: 03/17/23 0831    Comprehensive Metabolic Panel [[id number]] Collected: 03/17/23 0414    Specimen: Blood Updated: 03/17/23 0530     Glucose 98 mg/dL      BUN 11 mg/dL      Creatinine 0.66 mg/dL      Sodium 140 mmol/L      Potassium 4.1 mmol/L      Chloride 107 mmol/L      CO2 23.4 mmol/L      Calcium 9.0 mg/dL      Total Protein 6.2 g/dL      Albumin 3.8 g/dL      ALT (SGPT) 14 U/L      AST (SGOT) 20 U/L      Alkaline Phosphatase 61 U/L      Total Bilirubin 0.5 mg/dL      Globulin 2.4 gm/dL      A/G Ratio 1.6 g/dL      BUN/Creatinine Ratio 16.7     Anion Gap 9.6 mmol/L      eGFR 92.8 mL/min/1.73     Narrative:      GFR Normal >60  Chronic Kidney Disease <60  Kidney Failure <15    The GFR formula is only valid for adults with stable renal function between ages 18 and 70.    CBC Auto Differential [[id number]]  (Abnormal) Collected: 03/17/23 0414    Specimen: Blood Updated: 03/17/23 0510     WBC 7.50 10*3/mm3      RBC 3.53 10*6/mm3      Hemoglobin 11.9 g/dL      Hematocrit 34.7 %      MCV 98.3 fL      MCH 33.7 pg      MCHC 34.3 g/dL      RDW 11.9 %      RDW-SD 43.5 fl      MPV 10.1 fL      Platelets 273 10*3/mm3      Neutrophil % 63.1 %      Lymphocyte % 22.5 %      Monocyte % 10.4 %      Eosinophil % 2.8 %      Basophil % 0.8 %      Immature Grans % 0.4 %      Neutrophils, Absolute 4.73 10*3/mm3      Lymphocytes, Absolute 1.69 10*3/mm3      Monocytes, Absolute 0.78 10*3/mm3      Eosinophils, Absolute 0.21 10*3/mm3       Basophils, Absolute 0.06 10*3/mm3      Immature Grans, Absolute 0.03 10*3/mm3      nRBC 0.0 /100 WBC     High Sensitivity Troponin T 2Hr [[id number]]  (Abnormal) Collected: 03/16/23 1655    Specimen: Blood Updated: 03/16/23 1729     HS Troponin T 10 ng/L      Troponin T Delta 1 ng/L     Narrative:      High Sensitive Troponin T Reference Range:  <10.0 ng/L- Negative Female for AMI  <15.0 ng/L- Negative Male for AMI  >=10 - Abnormal Female indicating possible myocardial injury.  >=15 - Abnormal Male indicating possible myocardial injury.   Clinicians would have to utilize clinical acumen, EKG, Troponin, and serial changes to determine if it is an Acute Myocardial Infarction or myocardial injury due to an underlying chronic condition.         Urinalysis With Culture If Indicated - Urine, Clean Catch [[id number]]  (Abnormal) Collected: 03/16/23 1532    Specimen: Urine, Clean Catch Updated: 03/16/23 1545     Color, UA Yellow     Appearance, UA Clear     pH, UA 6.5     Specific Gravity, UA 1.011     Glucose, UA Negative     Ketones, UA Trace     Bilirubin, UA Negative     Blood, UA Trace     Protein, UA Negative     Leuk Esterase, UA Trace     Nitrite, UA Negative     Urobilinogen, UA 0.2 E.U./dL    Narrative:      In absence of clinical symptoms, the presence of pyuria, bacteria, and/or nitrites on the urinalysis result does not correlate with infection.    Urinalysis, Microscopic Only - Urine, Clean Catch [[id number]]  (Abnormal) Collected: 03/16/23 1532    Specimen: Urine, Clean Catch Updated: 03/16/23 1545     RBC, UA 6-12 /HPF      WBC, UA 0-2 /HPF      Comment: Urine culture not indicated.        Bacteria, UA None Seen /HPF      Squamous Epithelial Cells, UA 0-2 /HPF      Hyaline Casts, UA None Seen /LPF      Methodology Automated Microscopy    Comprehensive Metabolic Panel [[id number]]  (Abnormal) Collected: 03/16/23 1122    Specimen: Blood Updated: 03/16/23 1154     Glucose 115 mg/dL      BUN 12 mg/dL      Creatinine  0.83 mg/dL      Sodium 137 mmol/L      Potassium 4.6 mmol/L      Chloride 99 mmol/L      CO2 25.1 mmol/L      Calcium 10.0 mg/dL      Total Protein 7.4 g/dL      Albumin 4.3 g/dL      ALT (SGPT) 16 U/L      AST (SGOT) 22 U/L      Alkaline Phosphatase 72 U/L      Total Bilirubin 0.5 mg/dL      Globulin 3.1 gm/dL      A/G Ratio 1.4 g/dL      BUN/Creatinine Ratio 14.5     Anion Gap 12.9 mmol/L      eGFR 74.5 mL/min/1.73     Narrative:      GFR Normal >60  Chronic Kidney Disease <60  Kidney Failure <15    The GFR formula is only valid for adults with stable renal function between ages 18 and 70.    Single High Sensitivity Troponin T [[id number]]  (Normal) Collected: 03/16/23 1122    Specimen: Blood Updated: 03/16/23 1154     HS Troponin T 9 ng/L     Narrative:      High Sensitive Troponin T Reference Range:  <10.0 ng/L- Negative Female for AMI  <15.0 ng/L- Negative Male for AMI  >=10 - Abnormal Female indicating possible myocardial injury.  >=15 - Abnormal Male indicating possible myocardial injury.   Clinicians would have to utilize clinical acumen, EKG, Troponin, and serial changes to determine if it is an Acute Myocardial Infarction or myocardial injury due to an underlying chronic condition.         High Sensitivity Troponin T [[id number]]  (Normal) Collected: 03/16/23 1122    Specimen: Blood Updated: 03/16/23 1154     HS Troponin T 9 ng/L     Narrative:      High Sensitive Troponin T Reference Range:  <10.0 ng/L- Negative Female for AMI  <15.0 ng/L- Negative Male for AMI  >=10 - Abnormal Female indicating possible myocardial injury.  >=15 - Abnormal Male indicating possible myocardial injury.   Clinicians would have to utilize clinical acumen, EKG, Troponin, and serial changes to determine if it is an Acute Myocardial Infarction or myocardial injury due to an underlying chronic condition.         BNP [[id number]]  (Normal) Collected: 03/16/23 1122    Specimen: Blood Updated: 03/16/23 1151     proBNP 247.0 pg/mL      Narrative:      Among patients with dyspnea, NT-proBNP is highly sensitive for the detection of acute congestive heart failure. In addition NT-proBNP of <300 pg/ml effectively rules out acute congestive heart failure with 99% negative predictive value.    Results may be falsely decreased if patient taking Biotin.      CBC & Differential [[id number]]  (Abnormal) Collected: 03/16/23 1122    Specimen: Blood Updated: 03/16/23 1132    Narrative:      The following orders were created for panel order CBC & Differential.  Procedure                               Abnormality         Status                     ---------                               -----------         ------                     CBC Auto Differential[[id number]]        Abnormal            Final result                 Please view results for these tests on the individual orders.    CBC Auto Differential [[id number]]  (Abnormal) Collected: 03/16/23 1122    Specimen: Blood Updated: 03/16/23 1132     WBC 15.97 10*3/mm3      RBC 4.09 10*6/mm3      Hemoglobin 13.8 g/dL      Hematocrit 40.6 %      MCV 99.3 fL      MCH 33.7 pg      MCHC 34.0 g/dL      RDW 12.0 %      RDW-SD 43.9 fl      MPV 9.5 fL      Platelets 316 10*3/mm3      Neutrophil % 88.7 %      Lymphocyte % 5.3 %      Monocyte % 5.3 %      Eosinophil % 0.1 %      Basophil % 0.3 %      Immature Grans % 0.3 %      Neutrophils, Absolute 14.17 10*3/mm3      Lymphocytes, Absolute 0.85 10*3/mm3      Monocytes, Absolute 0.84 10*3/mm3      Eosinophils, Absolute 0.01 10*3/mm3      Basophils, Absolute 0.05 10*3/mm3      Immature Grans, Absolute 0.05 10*3/mm3      nRBC 0.0 /100 WBC           .  Imaging Results (Last 24 Hours)     Procedure Component Value Units Date/Time    CT Head Without Contrast [[id number]] Collected: 03/16/23 1203     Updated: 03/16/23 1408    Narrative:      CT HEAD WITHOUT CONTRAST     HISTORY: Syncope versus seizure.     COMPARISON: MRI brain 08/05/2019.     FINDINGS: The brain ventricles are  symmetrical. There is no evidence of  hemorrhage, hydrocephalus or of a focal area of decreased attenuation to  suggest acute infarction. Mild vascular calcification is noted.       Impression:      No acute process is identified. Further evaluation could be  performed with an MRI examination of the brain, particularly if the  patient has a history of new onset seizure activity.        Radiation dose reduction techniques were utilized, including automated  exposure control and exposure modulation based on body size.     This report was finalized on 3/16/2023 2:05 PM by Dr. Lb Jerome M.D.       XR Chest 1 View [[id number]] Collected: 03/16/23 1132     Updated: 03/16/23 1136    Narrative:      XR CHEST 1 VW-     HISTORY: Female who is 72 years-old,  syncope     TECHNIQUE: Frontal view of the chest     COMPARISON: 9/30/2021     FINDINGS: Heart, mediastinum and pulmonary vasculature are unremarkable.  No focal pulmonary consolidation, pleural effusion, or pneumothorax. Old  granulomatous disease is apparent. No acute osseous process.       Impression:      No evidence for acute pulmonary process. Follow-up as  clinical indications persist.     This report was finalized on 3/16/2023 11:33 AM by Dr. Geraldo Blakely M.D.                 Impression    72-year-old woman with past medical history of hearing loss, hyperlipidemia, presents after an unwitnessed syncopal episode.  History of syncopal episode in 2017 which sounds like possible vasovagal syncope.  Noted she has had several brain MRIs performed in the past as work-up for hearing loss with concern for a  vascular malformation in the posterior right parietal region, unclear if this is potentially contributory.  Differential diagnosis includes vasovagal episode, cardiac syncope, seizure.    Plan  TSH, A1c, B12, folate, ESR, CPK  MRI brain with and without contrast  EEG  Cardiology consult rule out cardiac syncope  We will follow along with you    I discussed  the patient's findings and my recommendations with patient     ####Addendum####1:30 PM  Results of above work-up reviewed  MRI brain without contrast performed, no acute findings  EEG -normal study in awake and drowsy states  Laboratory studies normal, CPK 68  Per primary provider report orthostatic vital signs were positive for orthostatic hypotension.    No further neuro work-up planned at this time.  Okay for DC, recommend MRI brain with contrast which can be performed as an outpatient, repeat EEG, and follow-up in neuro clinic.  Evaluate and treat for orthostatic hypotension      Wanda Cruz MD  03/17/23  09:02 EDT

## 2023-03-20 ENCOUNTER — TELEPHONE (OUTPATIENT)
Dept: NEUROLOGY | Facility: CLINIC | Age: 73
End: 2023-03-20

## 2023-03-20 NOTE — TELEPHONE ENCOUNTER
Caller: Tia Mcnulty    Relationship to patient: Self    Best call back number: [phone]    Chief complaint: SYNCOPE & COLLAPSE    Type of visit: CONSULTATION    Requested date: ASAP    Additional notes: I SEE HOSPITAL NOTES FROM DR. FERNÁNDEZ BUT PATIENT IS PRETTY SURE SHE SAW JOANN CEJA. SHE WAS TOLD ON HER DISCHARGE INSTRUCTIONS TO FOLLOW UP WITH JOANN CEJA UPON DISCHARGE.

## 2023-03-24 ENCOUNTER — HOSPITAL ENCOUNTER (OUTPATIENT)
Dept: CARDIOLOGY | Facility: HOSPITAL | Age: 73
Discharge: HOME | End: 2023-03-24
Admitting: INTERNAL MEDICINE
Payer: MEDICARE

## 2023-03-24 VITALS
HEIGHT: 63 IN | BODY MASS INDEX: 22.15 KG/M2 | DIASTOLIC BLOOD PRESSURE: 70 MMHG | SYSTOLIC BLOOD PRESSURE: 120 MMHG | WEIGHT: 125 LBS

## 2023-03-24 DIAGNOSIS — R55 SYNCOPE, UNSPECIFIED SYNCOPE TYPE: ICD-10-CM

## 2023-03-24 LAB
AORTIC DIMENSIONLESS INDEX: 0.7 (DI)
ASCENDING AORTA: 2.8 CM
BH CV ECHO MEAS - ACS: 2.02 CM
BH CV ECHO MEAS - AO MAX PG: 12.2 MMHG
BH CV ECHO MEAS - AO MEAN PG: 5.7 MMHG
BH CV ECHO MEAS - AO ROOT DIAM: 2.9 CM
BH CV ECHO MEAS - AO V2 MAX: 174.5 CM/SEC
BH CV ECHO MEAS - AO V2 VTI: 34.9 CM
BH CV ECHO MEAS - AVA(I,D): 2.33 CM2
BH CV ECHO MEAS - EDV(CUBED): 89.9 ML
BH CV ECHO MEAS - EDV(MOD-SP2): 66 ML
BH CV ECHO MEAS - EDV(MOD-SP4): 61 ML
BH CV ECHO MEAS - EF(MOD-BP): 68 %
BH CV ECHO MEAS - EF(MOD-SP2): 69.7 %
BH CV ECHO MEAS - EF(MOD-SP4): 65.6 %
BH CV ECHO MEAS - ESV(CUBED): 29.4 ML
BH CV ECHO MEAS - ESV(MOD-SP2): 20 ML
BH CV ECHO MEAS - ESV(MOD-SP4): 21 ML
BH CV ECHO MEAS - FS: 31.1 %
BH CV ECHO MEAS - IVS/LVPW: 1.09 CM
BH CV ECHO MEAS - IVSD: 0.84 CM
BH CV ECHO MEAS - LAT PEAK E' VEL: 6.1 CM/SEC
BH CV ECHO MEAS - LV DIASTOLIC VOL/BSA (35-75): 38.5 CM2
BH CV ECHO MEAS - LV MASS(C)D: 114.2 GRAMS
BH CV ECHO MEAS - LV MAX PG: 4.9 MMHG
BH CV ECHO MEAS - LV MEAN PG: 2.7 MMHG
BH CV ECHO MEAS - LV SYSTOLIC VOL/BSA (12-30): 13.3 CM2
BH CV ECHO MEAS - LV V1 MAX: 111 CM/SEC
BH CV ECHO MEAS - LV V1 VTI: 25.1 CM
BH CV ECHO MEAS - LVIDD: 4.5 CM
BH CV ECHO MEAS - LVIDS: 3.1 CM
BH CV ECHO MEAS - LVOT AREA: 3.2 CM2
BH CV ECHO MEAS - LVOT DIAM: 2.03 CM
BH CV ECHO MEAS - LVPWD: 0.77 CM
BH CV ECHO MEAS - MED PEAK E' VEL: 6 CM/SEC
BH CV ECHO MEAS - MV A DUR: 0.11 SEC
BH CV ECHO MEAS - MV A MAX VEL: 72.4 CM/SEC
BH CV ECHO MEAS - MV DEC SLOPE: 184.2 CM/SEC2
BH CV ECHO MEAS - MV DEC TIME: 0.24 MSEC
BH CV ECHO MEAS - MV E MAX VEL: 59.6 CM/SEC
BH CV ECHO MEAS - MV E/A: 0.82
BH CV ECHO MEAS - MV MAX PG: 2.9 MMHG
BH CV ECHO MEAS - MV MEAN PG: 0.91 MMHG
BH CV ECHO MEAS - MV P1/2T: 101.3 MSEC
BH CV ECHO MEAS - MV V2 VTI: 25.1 CM
BH CV ECHO MEAS - MVA(P1/2T): 2.17 CM2
BH CV ECHO MEAS - MVA(VTI): 3.2 CM2
BH CV ECHO MEAS - PA ACC TIME: 0.11 SEC
BH CV ECHO MEAS - PA PR(ACCEL): 29.1 MMHG
BH CV ECHO MEAS - PA V2 MAX: 101 CM/SEC
BH CV ECHO MEAS - PULM A REVS DUR: 0.11 SEC
BH CV ECHO MEAS - PULM A REVS VEL: 29 CM/SEC
BH CV ECHO MEAS - PULM DIAS VEL: 42.7 CM/SEC
BH CV ECHO MEAS - PULM S/D: 1.09
BH CV ECHO MEAS - PULM SYS VEL: 46.5 CM/SEC
BH CV ECHO MEAS - RAP SYSTOLE: 8 MMHG
BH CV ECHO MEAS - RV MAX PG: 3.5 MMHG
BH CV ECHO MEAS - RV V1 MAX: 93 CM/SEC
BH CV ECHO MEAS - RV V1 VTI: 16.2 CM
BH CV ECHO MEAS - RVSP: 29.6 MMHG
BH CV ECHO MEAS - SI(MOD-SP2): 29 ML/M2
BH CV ECHO MEAS - SI(MOD-SP4): 25.3 ML/M2
BH CV ECHO MEAS - SV(LVOT): 81.3 ML
BH CV ECHO MEAS - SV(MOD-SP2): 46 ML
BH CV ECHO MEAS - SV(MOD-SP4): 40 ML
BH CV ECHO MEAS - TAPSE (>1.6): 2.5 CM
BH CV ECHO MEAS - TR MAX PG: 21.6 MMHG
BH CV ECHO MEAS - TR MAX VEL: 232.1 CM/SEC
BH CV ECHO MEASUREMENTS AVERAGE E/E' RATIO: 9.85
BH CV XLRA - RV BASE: 2.9 CM
BH CV XLRA - RV LENGTH: 5.2 CM
BH CV XLRA - RV MID: 2.39 CM
BH CV XLRA - TDI S': 13.1 CM/SEC
LEFT ATRIUM VOLUME INDEX: 20.9 ML/M2
MAXIMAL PREDICTED HEART RATE: 147 BPM
SINUS: 2.48 CM
STJ: 2.37 CM
STRESS TARGET HR: 125 BPM

## 2023-03-24 PROCEDURE — 93306 TTE W/DOPPLER COMPLETE: CPT | Performed by: INTERNAL MEDICINE

## 2023-03-24 PROCEDURE — 93306 TTE W/DOPPLER COMPLETE: CPT

## 2023-03-24 NOTE — TELEPHONE ENCOUNTER
PT NEEDS A CALL     PT IS CALLING AGAIN , HER DISCHARGE STATES NEEDS EEG , AND MRI (DISCHARGE SAYS WITH)  ORDER SAYS WITHOUT CONTRAST ? PT NEEDS TO KNOW WHICH SO CAN CALL [facility] TO SCHED.     SHE DID NOT SEE [facility] SHE SAW BENIGNO FERNÁNDEZ . BUT HER DISCHARGE STATED TO F.U WITH [facility] DANG ? NEEDS TO GET EEG AND MRI SCHED WITH [facility] AND THEN CAN F.U WITH US?     PT NEEDS A PHONE CALL TODAY TO LET HER KNOW WHAT SHE IS DOING, AND WHO SHE IS F.U WITH?     MIRIAM [phone]    PLEASE ADVISE.

## 2023-03-27 NOTE — PROGRESS NOTES
Please notify Mrs Mcnulty that she has a normal heart and valve function. Follow up with cardiology clinic as needed and call with any questions or concerns.  Thank you

## 2023-03-28 ENCOUNTER — TELEPHONE (OUTPATIENT)
Dept: CARDIOLOGY | Facility: HOSPITAL | Age: 73
End: 2023-03-28
Payer: MEDICARE

## 2023-03-28 NOTE — TELEPHONE ENCOUNTER
Reviewed results and recommendations with Tia Mcnulty.  Patient verbalized understanding of results and recommendations.    Thank you,  Rosa NAVARRO RN  Triage Nurse [facility]

## 2023-03-28 NOTE — TELEPHONE ENCOUNTER
----- Message from Rod Jenkins MD sent at 3/27/2023  4:04 PM EDT -----  Please notify Mrs Mcnulty that she has a normal heart and valve function. Follow up with cardiology clinic as needed and call with any questions or concerns.  Thank you

## 2023-03-29 NOTE — TELEPHONE ENCOUNTER
"  Caller: MIRIAM   Relationship to Patient: SELF  Phone Number: [phone]    Reason for Call: PT HAS NO ORDERS IN SYSTEM IN ORDER TO JADEN HER REPEAT EEG AND MRI WITH CONTRAST    PER CHART   \"No further neuro work-up planned at this time.  Okay for DC, recommend MRI brain with contrast which can be performed as an outpatient, repeat EEG, and follow-up in neuro clinic.  Evaluate and treat for orthostatic hypotension  Wanda Cruz MD\"    PLEASE REVIEW ADVISE AND SEND ORDERS IF NEEDED     PATIENT REQUEST A CALL WHEN ORDERS HAVE BEEN SENT TO [facility] SCHEDULING PLEASE.   "

## 2023-04-04 ENCOUNTER — TRANSCRIBE ORDERS (OUTPATIENT)
Dept: ADMINISTRATIVE | Facility: HOSPITAL | Age: 73
End: 2023-04-04
Payer: MEDICARE

## 2023-04-04 DIAGNOSIS — R55 SYNCOPE AND COLLAPSE: Primary | ICD-10-CM

## 2023-04-04 DIAGNOSIS — I67.9 CEREBROVASCULAR DISEASE, UNSPECIFIED: ICD-10-CM

## 2023-04-04 NOTE — TELEPHONE ENCOUNTER
Provider: BETH DANG  Caller: PATIENT  Relationship to Patient: SELF  Phone Number: [phone]  Reason for Call: PATIENT WOULD LIKE TO KNOW IF SHE NEEDS TO GET AN MRI & EEG BEFORE THE HOSPITAL FOLLOW UP IN June AND IF SO CAN THE ORDERS PLEASE BE PLACED. PLEASE REVIEW AND ADVISE, THANK YOU.

## 2023-04-11 ENCOUNTER — HOSPITAL ENCOUNTER (OUTPATIENT)
Dept: CARDIOLOGY | Facility: HOSPITAL | Age: 73
Discharge: HOME | End: 2023-04-11
Admitting: INTERNAL MEDICINE
Payer: MEDICARE

## 2023-04-11 DIAGNOSIS — R55 SYNCOPE AND COLLAPSE: ICD-10-CM

## 2023-04-11 DIAGNOSIS — I67.9 CEREBROVASCULAR DISEASE, UNSPECIFIED: ICD-10-CM

## 2023-04-11 LAB
BH CV XLRA MEAS LEFT DIST CCA EDV: 19.9 CM/SEC
BH CV XLRA MEAS LEFT DIST CCA PSV: 70.9 CM/SEC
BH CV XLRA MEAS LEFT DIST ICA EDV: -24 CM/SEC
BH CV XLRA MEAS LEFT DIST ICA PSV: -76.8 CM/SEC
BH CV XLRA MEAS LEFT ICA/CCA RATIO: 1.11
BH CV XLRA MEAS LEFT MID ICA EDV: -22 CM/SEC
BH CV XLRA MEAS LEFT MID ICA PSV: -78.6 CM/SEC
BH CV XLRA MEAS LEFT PROX CCA EDV: 19.9 CM/SEC
BH CV XLRA MEAS LEFT PROX CCA PSV: 93.2 CM/SEC
BH CV XLRA MEAS LEFT PROX ECA EDV: -13.5 CM/SEC
BH CV XLRA MEAS LEFT PROX ECA PSV: -72.1 CM/SEC
BH CV XLRA MEAS LEFT PROX ICA EDV: -12.6 CM/SEC
BH CV XLRA MEAS LEFT PROX ICA PSV: -44.4 CM/SEC
BH CV XLRA MEAS LEFT PROX SCLA PSV: 130 CM/SEC
BH CV XLRA MEAS LEFT VERTEBRAL A EDV: 16.4 CM/SEC
BH CV XLRA MEAS LEFT VERTEBRAL A PSV: 80.3 CM/SEC
BH CV XLRA MEAS RIGHT DIST CCA EDV: -18.2 CM/SEC
BH CV XLRA MEAS RIGHT DIST CCA PSV: -85 CM/SEC
BH CV XLRA MEAS RIGHT DIST ICA EDV: -29.3 CM/SEC
BH CV XLRA MEAS RIGHT DIST ICA PSV: -101 CM/SEC
BH CV XLRA MEAS RIGHT ICA/CCA RATIO: 1.19
BH CV XLRA MEAS RIGHT MID ICA EDV: -24 CM/SEC
BH CV XLRA MEAS RIGHT MID ICA PSV: -86.2 CM/SEC
BH CV XLRA MEAS RIGHT PROX CCA EDV: -15.7 CM/SEC
BH CV XLRA MEAS RIGHT PROX CCA PSV: -103 CM/SEC
BH CV XLRA MEAS RIGHT PROX ECA EDV: -13 CM/SEC
BH CV XLRA MEAS RIGHT PROX ECA PSV: -71.5 CM/SEC
BH CV XLRA MEAS RIGHT PROX ICA EDV: -14.1 CM/SEC
BH CV XLRA MEAS RIGHT PROX ICA PSV: -55 CM/SEC
BH CV XLRA MEAS RIGHT PROX SCLA PSV: 153 CM/SEC
BH CV XLRA MEAS RIGHT VERTEBRAL A EDV: -16.1 CM/SEC
BH CV XLRA MEAS RIGHT VERTEBRAL A PSV: -65.6 CM/SEC
MAXIMAL PREDICTED HEART RATE: 147 BPM
STRESS TARGET HR: 125 BPM

## 2023-04-11 PROCEDURE — 93880 EXTRACRANIAL BILAT STUDY: CPT

## 2023-04-14 NOTE — TELEPHONE ENCOUNTER
I spoke with pt and reviewed providers response to her recent phone message (MRI & EEG orders).  Pt voices understanding and agrees.

## 2023-08-24 ENCOUNTER — TRANSCRIBE ORDERS (OUTPATIENT)
Dept: PET IMAGING | Facility: HOSPITAL | Age: 73
End: 2023-08-24
Payer: MEDICARE

## 2023-08-24 ENCOUNTER — APPOINTMENT (OUTPATIENT)
Dept: WOMENS IMAGING | Facility: HOSPITAL | Age: 73
End: 2023-08-24
Payer: MEDICARE

## 2023-08-24 ENCOUNTER — HOSPITAL ENCOUNTER (OUTPATIENT)
Dept: PET IMAGING | Facility: HOSPITAL | Age: 73
Discharge: HOME | End: 2023-08-24
Payer: MEDICARE

## 2023-08-24 DIAGNOSIS — M81.0 HIGH RISK FOR FRACTURE DUE TO OSTEOPOROSIS BY DEXA SCAN: Primary | ICD-10-CM

## 2023-08-24 PROCEDURE — 77080 DXA BONE DENSITY AXIAL: CPT

## 2023-08-24 PROCEDURE — 77080 DXA BONE DENSITY AXIAL: CPT | Performed by: RADIOLOGY

## 2023-08-24 PROCEDURE — 77063 BREAST TOMOSYNTHESIS BI: CPT | Performed by: RADIOLOGY

## 2023-08-24 PROCEDURE — 77067 SCR MAMMO BI INCL CAD: CPT | Performed by: RADIOLOGY

## 2024-06-03 ENCOUNTER — TELEPHONE (OUTPATIENT)
Dept: NEUROLOGY | Facility: CLINIC | Age: 74
End: 2024-06-03
Payer: MEDICARE

## 2024-06-05 DIAGNOSIS — I65.29 STENOSIS OF CAROTID ARTERY, UNSPECIFIED LATERALITY: Primary | ICD-10-CM

## 2024-06-05 DIAGNOSIS — I65.23 CAROTID STENOSIS, BILATERAL: ICD-10-CM

## 2024-06-19 ENCOUNTER — HOSPITAL ENCOUNTER (OUTPATIENT)
Dept: CARDIOLOGY | Facility: HOSPITAL | Age: 74
Discharge: HOME | End: 2024-06-19
Admitting: NURSE PRACTITIONER
Payer: MEDICARE

## 2024-06-19 DIAGNOSIS — I65.23 CAROTID STENOSIS, BILATERAL: ICD-10-CM

## 2024-06-19 LAB
BH CV XLRA MEAS LEFT DIST CCA EDV: -13.6 CM/SEC
BH CV XLRA MEAS LEFT DIST CCA PSV: -56.6 CM/SEC
BH CV XLRA MEAS LEFT DIST ICA EDV: -24.6 CM/SEC
BH CV XLRA MEAS LEFT DIST ICA PSV: -74.2 CM/SEC
BH CV XLRA MEAS LEFT ICA/CCA RATIO: 1.36
BH CV XLRA MEAS LEFT MID ICA EDV: -20.2 CM/SEC
BH CV XLRA MEAS LEFT MID ICA PSV: -77.2 CM/SEC
BH CV XLRA MEAS LEFT PROX CCA EDV: 19.9 CM/SEC
BH CV XLRA MEAS LEFT PROX CCA PSV: 95.7 CM/SEC
BH CV XLRA MEAS LEFT PROX ECA EDV: -10.5 CM/SEC
BH CV XLRA MEAS LEFT PROX ECA PSV: -54.9 CM/SEC
BH CV XLRA MEAS LEFT PROX ICA EDV: 11.3 CM/SEC
BH CV XLRA MEAS LEFT PROX ICA PSV: 34.3 CM/SEC
BH CV XLRA MEAS LEFT VERTEBRAL A PSV: 96.3 CM/SEC
BH CV XLRA MEAS RIGHT DIST CCA EDV: -16.1 CM/SEC
BH CV XLRA MEAS RIGHT DIST CCA PSV: -55.3 CM/SEC
BH CV XLRA MEAS RIGHT DIST ICA EDV: -28 CM/SEC
BH CV XLRA MEAS RIGHT DIST ICA PSV: -78.1 CM/SEC
BH CV XLRA MEAS RIGHT ICA/CCA RATIO: 1.41
BH CV XLRA MEAS RIGHT MID ICA EDV: -23.5 CM/SEC
BH CV XLRA MEAS RIGHT MID ICA PSV: -71.3 CM/SEC
BH CV XLRA MEAS RIGHT PROX CCA EDV: 12.3 CM/SEC
BH CV XLRA MEAS RIGHT PROX CCA PSV: 67.3 CM/SEC
BH CV XLRA MEAS RIGHT PROX ECA EDV: -8.6 CM/SEC
BH CV XLRA MEAS RIGHT PROX ECA PSV: -47 CM/SEC
BH CV XLRA MEAS RIGHT PROX ICA EDV: -13.3 CM/SEC
BH CV XLRA MEAS RIGHT PROX ICA PSV: -48.2 CM/SEC
BH CV XLRA MEAS RIGHT PROX SCLA PSV: 119.2 CM/SEC
BH CV XLRA MEAS RIGHT VERTEBRAL A EDV: 9.4 CM/SEC
BH CV XLRA MEAS RIGHT VERTEBRAL A PSV: 43.9 CM/SEC

## 2024-06-19 PROCEDURE — 93880 EXTRACRANIAL BILAT STUDY: CPT

## 2024-06-19 PROCEDURE — 93880 EXTRACRANIAL BILAT STUDY: CPT | Performed by: STUDENT IN AN ORGANIZED HEALTH CARE EDUCATION/TRAINING PROGRAM

## 2024-06-27 ENCOUNTER — TELEPHONE (OUTPATIENT)
Dept: NEUROLOGY | Facility: CLINIC | Age: 74
End: 2024-06-27
Payer: MEDICARE

## 2024-06-27 NOTE — TELEPHONE ENCOUNTER
----- Message from Diana Figueroa sent at 6/19/2024  2:19 PM EDT -----  Carotid ultrasound stable continues to have less than 50% bilateral ICA stenosis-no change in current plan.  Keep planned follow-up will consider repeating ultrasound biannually

## 2024-08-29 ENCOUNTER — APPOINTMENT (OUTPATIENT)
Dept: WOMENS IMAGING | Facility: HOSPITAL | Age: 74
End: 2024-08-29
Payer: MEDICARE

## 2024-08-29 PROCEDURE — 77067 SCR MAMMO BI INCL CAD: CPT | Performed by: RADIOLOGY

## 2024-08-29 PROCEDURE — 77063 BREAST TOMOSYNTHESIS BI: CPT | Performed by: RADIOLOGY

## 2024-09-09 ENCOUNTER — OFFICE VISIT (OUTPATIENT)
Dept: NEUROLOGY | Facility: CLINIC | Age: 74
End: 2024-09-09
Payer: MEDICARE

## 2024-09-09 ENCOUNTER — TELEPHONE (OUTPATIENT)
Dept: NEUROLOGY | Facility: CLINIC | Age: 74
End: 2024-09-09

## 2024-09-09 VITALS
HEIGHT: 63 IN | OXYGEN SATURATION: 96 % | BODY MASS INDEX: 21.86 KG/M2 | SYSTOLIC BLOOD PRESSURE: 126 MMHG | WEIGHT: 123.4 LBS | HEART RATE: 64 BPM | DIASTOLIC BLOOD PRESSURE: 80 MMHG

## 2024-09-09 DIAGNOSIS — R42 EPISODIC LIGHTHEADEDNESS: Primary | ICD-10-CM

## 2024-09-09 DIAGNOSIS — I65.23 BILATERAL CAROTID ARTERY STENOSIS: ICD-10-CM

## 2024-09-09 DIAGNOSIS — Z87.898 HISTORY OF SYNCOPE: ICD-10-CM

## 2024-09-09 DIAGNOSIS — G89.29 CHRONIC LEFT SHOULDER PAIN: ICD-10-CM

## 2024-09-09 DIAGNOSIS — M25.512 CHRONIC LEFT SHOULDER PAIN: ICD-10-CM

## 2024-09-09 PROCEDURE — 99213 OFFICE O/P EST LOW 20 MIN: CPT | Performed by: NURSE PRACTITIONER

## 2024-09-09 PROCEDURE — 1159F MED LIST DOCD IN RCRD: CPT | Performed by: NURSE PRACTITIONER

## 2024-09-09 PROCEDURE — 1160F RVW MEDS BY RX/DR IN RCRD: CPT | Performed by: NURSE PRACTITIONER

## 2024-09-09 RX ORDER — LANOLIN ALCOHOL/MO/W.PET/CERES
1000 CREAM (GRAM) TOPICAL DAILY
COMMUNITY

## 2024-09-09 NOTE — PROGRESS NOTES
Notes by LPN:  Doing well. Had carotid US in June. She has occasional episodes of light headedness that she has noticed the last year or so.         CC:    HPI:  Tia Mcnulty is a  74 y.o.  right-handed female with known past medical history of hearing loss (bilateral hearing aids; right greater than left), hypercholesterolemia (prior statin intolerance) skin cancer, right total knee replacement, carotid stenosis (mild) who I am seeing today in follow-up due to hospital visit where patient presented with vasovagal syncope.     Patient was last seen in follow-up by me June 2023 for the same since that time, she denies any further syncopal episodes although does report some very occasional intermittent lightheadedness although not worsened specifically by changing positions-orthostatic BPs completed today and were unremarkable.  No evidence of nystagmus on exam.  She notes that she often veers to the right when walking-not noted today. During her prior hospitalization she had some carotid stenosis and we recommended repeat carotid ultrasound in 1 year which she completed June 2024 which showed less than 50% ICA stenosis; unchanged from prior.  She denies any recent falls although reports some near misses.  Does not use any assistive devices.  Completes all ADLs without assistance.  She denies any new complaints or concerns; reports some numbness of left shoulder without much discomfort although has some tenderness that is able to be reproduced at the scapula.  He denies any other complaints or concerns on my exam I will plan to see patient back in 1 year and have carotid ultrasound completed in 1 year's time as well after that can go to every 2 years        Past Medical History:   Diagnosis Date    Cancer     GERD (gastroesophageal reflux disease)     History of colon polyps     History of skin cancer     [facility] (hard of hearing)     HEARING AID EACH EAR    Hypercholesteremia          Past Surgical History:    Procedure Laterality Date    COLONOSCOPY      2013    ENDOSCOPY N/A 08/28/2018    Small HH, gastritis    MOHS SURGERY      REPLACEMENT TOTAL KNEE Right 2021    ROTATOR CUFF REPAIR      WISDOM TOOTH EXTRACTION             Current Outpatient Medications:     Acetaminophen (Acetaminophen Extra Strength) 500 MG capsule, Take 1,000 mg by mouth Every 6 (Six) Hours As Needed for Mild Pain., Disp: , Rfl:     Ascorbic Acid (Vitamin C) 500 MG capsule, Take 1,000 mg by mouth Daily., Disp: , Rfl:     B Complex Vitamins (VITAMIN B COMPLEX PO), Take 1 tablet by mouth Daily., Disp: , Rfl:     Cholecalciferol (VITAMIN D-3 PO), Take 1 tablet by mouth Daily., Disp: , Rfl:     ezetimibe (ZETIA) 10 MG tablet, Take 1 tablet by mouth Daily., Disp: , Rfl:     vitamin B-12 (CYANOCOBALAMIN) 1000 MCG tablet, Take 1 tablet by mouth Daily., Disp: , Rfl:     Ibuprofen 3 %, Gabapentin 10 %, Baclofen 2 %, lidocaine 4 %, Ketamine HCl 10 %, Apply 1-2 g topically to the appropriate area as directed 3 (Three) to 4 (Four) times daily., Disp: 90 g, Rfl: 5      Family History   Problem Relation Age of Onset    Stomach cancer Paternal Grandmother     Stroke Mother     Heart disease Father          Social History     Socioeconomic History    Marital status: Single   Tobacco Use    Smoking status: Never     Passive exposure: Never    Smokeless tobacco: Never   Vaping Use    Vaping status: Never Used   Substance and Sexual Activity    Alcohol use: No    Drug use: No    Sexual activity: Never         Allergies   Allergen Reactions    Hydrocodone Mental Status Change     PER PT    Oxycodone Mental Status Change     PER PT         Pain Scale:0        ROS:  Review of Systems   Neurological:  Positive for light-headedness (on occasion).           Physical Exam:  Vitals:    09/09/24 1049 09/09/24 1053 09/09/24 1054 09/09/24 1055   BP:       BP Location:       Patient Position:       Cuff Size:       Pulse:       SpO2: 97% 96% 96% 96%   Weight:       Height:          Body mass index is 21.86 kg/m².  Orthostatic blood pressure:  Lying 121/80 heart rate 63  Sitting 116/64 heart rate 61  Standing 118/74 heart rate 71    Physical Examination: NIHSS: 0     mRS: 0  General Appearance:           Well developed, well nourished, well groomed, alert, and cooperative.  HEENT:                                   Normocephalic.    Neck and Spine:                    Normal range of motion.  Normal alignment. No mass or tenderness. No bruits.  Cardiac:                                 Regular rate and rhythm. No murmurs.  Peripheral Vasculature:       Radial and pedal pulses are equal and symmetric.   Extremities:                                    No edema or deformities. Normal joint ROM.  Skin:                                       No rashes or birth marks.     Neurological examination:  Higher Integrative  Function:                                Oriented to time, place and person. Normal registration, recall, attention span and concentration. Normal language including comprehension, spontaneous speech, repetition, reading, writing, naming and vocabulary. No neglect with normal visual-spatial function and construction. Normal fund of knowledge and higher integrative function.  CN II:                                       Pupils are equal, round, and reactive to light. Normal visual acuity and visual fields.    CN III IV VI:                             Extraocular movements are full without nystagmus.   CN V:                                      Normal facial sensation and strength of muscles of mastication.  CN VII:                                    Facial movements are symmetric. No weakness.  CN VIII:                                   Auditory acuity is abnormal; bilateral hearing aids right hearing worse than left-followed by ENT  CN IX & X:                              Symmetric palatal movement.  CN XI:                                     Sternocleidomastoid and trapezius are  normal.  No weakness.  CN XII:                                    The tongue is midline.  No atrophy or fasciculations.  Motor:                                     Normal muscle strength, bulk and tone in upper and lower extremities.  No fasciculations, rigidity, spasticity, or abnormal movements.  Sensation:                              Normal to light touch in arms and legs.   Station and Gait:                   Normal gait and station.    Coordination:                        Finger to nose test shows no dysmetria.         Results:      Lab Results   Component Value Date    GLUCOSE 98 03/17/2023    BUN 11 03/17/2023    CREATININE 0.66 03/17/2023    EGFRIFNONA 77 09/30/2021    BCR 16.7 03/17/2023    CO2 23.4 03/17/2023    CALCIUM 9.0 03/17/2023    ALBUMIN 3.8 03/17/2023    AST 20 03/17/2023    ALT 14 03/17/2023       Lab Results   Component Value Date    WBC 7.50 03/17/2023    HGB 11.9 (L) 03/17/2023    HCT 34.7 03/17/2023    MCV 98.3 (H) 03/17/2023     03/17/2023         Lab Results   Component Value Date    TSH 1.830 03/17/2023         Lab Results   Component Value Date    KUFCXFQI24 421 03/17/2023         Lab Results   Component Value Date    FOLATE >20.00 03/17/2023         Lab Results   Component Value Date    HGBA1C 5.30 03/17/2023         Diagnoses:  1.  History of vasovagal syncope; continues to report some intermittent episodes of lightheadedness-orthostatic BPs unremarkable  2.  Hyperlipidemia; prior statin intolerance on Zetia  3.  Carotid stenosis; mild repeat carotid ultrasound stable/unchanged  4.  Shoulder discomfort; Rx alternatives topical cream        Plan:  Avoid dehydration/hypotension  Consider compression stockings/ABD binder  Carotid ultrasound in 1 year then every 2 to 3 years thereafter  Rx alternatives topical pain    Diagnoses and all orders for this visit:    1. Episodic lightheadedness (Primary)    2. Chronic left shoulder pain  -     Ibuprofen 3 %, Gabapentin 10 %, Baclofen 2 %,  lidocaine 4 %, Ketamine HCl 10 %; Apply 1-2 g topically to the appropriate area as directed 3 (Three) to 4 (Four) times daily.  Dispense: 90 g; Refill: 5    3. Bilateral carotid artery stenosis  -     US Carotid Bilateral; Future    4. History of syncope                                    Dictated utilizing Dragon dictation.

## 2024-12-05 ENCOUNTER — TRANSCRIBE ORDERS (OUTPATIENT)
Dept: ADMINISTRATIVE | Facility: HOSPITAL | Age: 74
End: 2024-12-05
Payer: MEDICARE

## 2024-12-05 DIAGNOSIS — R10.10 UPPER ABDOMINAL PAIN: ICD-10-CM

## 2024-12-05 DIAGNOSIS — R10.30 LOWER ABDOMINAL PAIN: ICD-10-CM

## 2024-12-06 ENCOUNTER — HOSPITAL ENCOUNTER (OUTPATIENT)
Dept: CT IMAGING | Facility: HOSPITAL | Age: 74
Discharge: HOME | End: 2024-12-06
Payer: MEDICARE

## 2024-12-06 DIAGNOSIS — R10.10 UPPER ABDOMINAL PAIN: ICD-10-CM

## 2024-12-06 DIAGNOSIS — R10.30 LOWER ABDOMINAL PAIN: ICD-10-CM

## 2024-12-06 PROCEDURE — 25510000001 IOPAMIDOL 61 % SOLUTION: Performed by: INTERNAL MEDICINE

## 2024-12-06 PROCEDURE — 74177 CT ABD & PELVIS W/CONTRAST: CPT

## 2024-12-06 PROCEDURE — 25510000002 DIATRIZOATE MEGLUMINE & SODIUM PER 1 ML: Performed by: INTERNAL MEDICINE

## 2024-12-06 RX ORDER — IOPAMIDOL 612 MG/ML
100 INJECTION, SOLUTION INTRAVASCULAR
Status: COMPLETED | OUTPATIENT
Start: 2024-12-06 | End: 2024-12-06

## 2024-12-06 RX ORDER — DIATRIZOATE MEGLUMINE AND DIATRIZOATE SODIUM 660; 100 MG/ML; MG/ML
30 SOLUTION ORAL; RECTAL
Status: COMPLETED | OUTPATIENT
Start: 2024-12-06 | End: 2024-12-06

## 2024-12-06 RX ADMIN — DIATRIZOATE MEGLUMINE AND DIATRIZOATE SODIUM 30 ML: 660; 100 LIQUID ORAL; RECTAL at 16:19

## 2024-12-06 RX ADMIN — IOPAMIDOL 85 ML: 612 INJECTION, SOLUTION INTRAVENOUS at 16:19

## 2025-04-28 ENCOUNTER — OFFICE VISIT (OUTPATIENT)
Dept: GASTROENTEROLOGY | Facility: CLINIC | Age: 75
End: 2025-04-28
Payer: MEDICARE

## 2025-04-28 VITALS
DIASTOLIC BLOOD PRESSURE: 81 MMHG | WEIGHT: 128.5 LBS | HEART RATE: 76 BPM | HEIGHT: 63 IN | BODY MASS INDEX: 22.77 KG/M2 | SYSTOLIC BLOOD PRESSURE: 147 MMHG | TEMPERATURE: 98.1 F

## 2025-04-28 DIAGNOSIS — K21.9 GASTROESOPHAGEAL REFLUX DISEASE, UNSPECIFIED WHETHER ESOPHAGITIS PRESENT: ICD-10-CM

## 2025-04-28 DIAGNOSIS — M54.50 LOW BACK PAIN, UNSPECIFIED BACK PAIN LATERALITY, UNSPECIFIED CHRONICITY, UNSPECIFIED WHETHER SCIATICA PRESENT: Primary | ICD-10-CM

## 2025-04-28 DIAGNOSIS — K59.00 CONSTIPATION, UNSPECIFIED CONSTIPATION TYPE: ICD-10-CM

## 2025-04-28 PROCEDURE — 1159F MED LIST DOCD IN RCRD: CPT

## 2025-04-28 PROCEDURE — 1160F RVW MEDS BY RX/DR IN RCRD: CPT

## 2025-04-28 PROCEDURE — 99204 OFFICE O/P NEW MOD 45 MIN: CPT

## 2025-04-28 RX ORDER — DICYCLOMINE HYDROCHLORIDE 10 MG/1
CAPSULE ORAL
COMMUNITY
Start: 2025-02-10

## 2025-04-28 NOTE — PROGRESS NOTES
"Chief Complaint  Nausea, Vomiting, and Abdominal Pain    Subjective          History of Present Illness    Tia Mcnulty is a  74 y.o. female presents for new over 3-year office visit.  She follows with Dr. Perez and is new to me.  She has a history of nausea, vomiting, GERD.    Today she states that in November she was referred to Dr. Linares for a colonoscopy due to reported lower back pain. She underwent EGD, colonoscopy, HIDA as beow which were all pretty unremarkable. She states the discomfort was in her lower back and would radiate around her sides. Regular bowel movements - each morning.   Good appetite. Weight is stable. She does feel bloated - denies excess gas or bloating.     She has been taking omeprazole for reflux which has controlled her symptoms.     1/13/25 colonoscopy - normal examined colon. Repeat 5 years.     3/3/25 EGD - non severe reflux esophagitis w/ no bleeding, small hiatal hernia, gastritis, no gross lesions in entire examined duodenum.     2/6/25 HIDA - normal    8/28/2018 EGD showed irregular Z-line, normal esophagus, small hiatal hernia, gastritis, normal duodenal bulb and second portion of the duodenum which were biopsied.  Negative for H. pylori.    6/2/2021 colonoscopy showed nonthrombosed external hemorrhoids, tortuous colon, normal examined portion of ileum.  Repeat colonoscopy in 5 years for surveillance.    CT Abd pelvis w/ unremarkable liver and gallbladder with no biliary dilation.  Mild focal fatty infiltration, normal pancreas, no acute bowel abnormality.  Formed stool throughout the right and transverse colon.    Objective   Vital Signs:   /81 (BP Location: Left arm, Patient Position: Sitting, Cuff Size: Adult)   Pulse 76   Temp 98.1 °F (36.7 °C) (Temporal)   Ht 160 cm (62.99\")   Wt 58.3 kg (128 lb 8 oz)   BMI 22.77 kg/m²       Physical Exam  Constitutional:       General: She is not in acute distress.     Appearance: Normal appearance.   Eyes:      General: " No scleral icterus.  Pulmonary:      Effort: Pulmonary effort is normal.   Abdominal:      General: Abdomen is flat. There is no distension.      Tenderness: There is no abdominal tenderness. There is no guarding.   Skin:     Coloration: Skin is not jaundiced.   Neurological:      General: No focal deficit present.      Mental Status: She is alert and oriented to person, place, and time.   Psychiatric:         Mood and Affect: Mood normal.         Behavior: Behavior normal.          Result Review :   The following data was reviewed by: Honey Crawley PA-C on 04/28/2025:              Assessment:   Diagnoses and all orders for this visit:    1. Low back pain, unspecified back pain laterality, unspecified chronicity, unspecified whether sciatica present (Primary)    2. Constipation, unspecified constipation type    3. Gastroesophageal reflux disease, unspecified whether esophagitis present          Plan:   - Back pain is worse with movement and relieved with sitting.  Her GI workup has been unremarkable so far other than formed stool throughout transverse and right colon on CT scan.  Recommend starting on fiber supplement and we could consider MiraLAX as needed in the future but discomfort may be musculoskeletal in nature given relief w/o movement.   -Bloating may be relieved w/ more complete bowel movements as well  -Ok to continue over the counter omeprazole  -Can try Ibgard as needed for bloating.       Follow Up   Return if symptoms worsen or fail to improve.    Dragon dictation used throughout this note.         Honey Crawley PA-C  [facility]  [address] [place] [address] [address]  [place] [address]  Office: [phone]

## (undated) DEVICE — BITEBLOCK OMNI BLOC

## (undated) DEVICE — Device: Brand: DEFENDO AIR/WATER/SUCTION AND BIOPSY VALVE

## (undated) DEVICE — CANNULA,ADULT,SOFT-TOUCH,7'TUBE,UC: Brand: PENDING

## (undated) DEVICE — TUBING, SUCTION, 1/4" X 10', STRAIGHT: Brand: MEDLINE